# Patient Record
Sex: MALE | Race: WHITE | Employment: FULL TIME | URBAN - METROPOLITAN AREA
[De-identification: names, ages, dates, MRNs, and addresses within clinical notes are randomized per-mention and may not be internally consistent; named-entity substitution may affect disease eponyms.]

---

## 2017-07-02 ENCOUNTER — HOSPITAL ENCOUNTER (EMERGENCY)
Age: 32
Discharge: HOME | End: 2017-07-02
Payer: COMMERCIAL

## 2017-07-02 ENCOUNTER — APPOINTMENT (OUTPATIENT)
Dept: GENERAL RADIOLOGY | Age: 32
End: 2017-07-02
Payer: COMMERCIAL

## 2017-07-02 VITALS
TEMPERATURE: 98 F | BODY MASS INDEX: 24.25 KG/M2 | RESPIRATION RATE: 16 BRPM | HEIGHT: 75 IN | DIASTOLIC BLOOD PRESSURE: 85 MMHG | SYSTOLIC BLOOD PRESSURE: 140 MMHG | HEART RATE: 80 BPM | OXYGEN SATURATION: 98 % | WEIGHT: 195 LBS

## 2017-07-02 DIAGNOSIS — S61.411A LACERATION OF RIGHT HAND, FOREIGN BODY PRESENCE UNSPECIFIED, INITIAL ENCOUNTER: Primary | ICD-10-CM

## 2017-07-02 PROCEDURE — 90471 IMMUNIZATION ADMIN: CPT

## 2017-07-02 PROCEDURE — 99283 EMERGENCY DEPT VISIT LOW MDM: CPT

## 2017-07-02 PROCEDURE — 77030002916 HC SUT ETHLN J&J -A

## 2017-07-02 PROCEDURE — 75810000293 HC SIMP/SUPERF WND  RPR

## 2017-07-02 PROCEDURE — 90715 TDAP VACCINE 7 YRS/> IM: CPT

## 2017-07-02 PROCEDURE — 77030002986 HC SUT PROL J&J -A

## 2017-07-02 PROCEDURE — 74011250636 HC RX REV CODE- 250/636

## 2017-07-02 PROCEDURE — 73130 X-RAY EXAM OF HAND: CPT

## 2017-07-02 RX ORDER — MIRTAZAPINE 30 MG/1
30 TABLET, FILM COATED ORAL
COMMUNITY

## 2017-07-02 RX ORDER — ALPRAZOLAM 0.5 MG/1
0.5 TABLET ORAL
COMMUNITY

## 2017-07-02 RX ADMIN — TETANUS TOXOID, REDUCED DIPHTHERIA TOXOID AND ACELLULAR PERTUSSIS VACCINE, ADSORBED 0.5 ML: 5; 2.5; 8; 8; 2.5 SUSPENSION INTRAMUSCULAR at 03:05
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
## 2017-07-02 NOTE — DISCHARGE INSTRUCTIONS

## 2019-11-08 ENCOUNTER — HOSPITAL ENCOUNTER (EMERGENCY)
Age: 34
Discharge: STILL ADMITTED OR ADMINISTRATIVE | End: 2019-11-08
Attending: EMERGENCY MEDICINE
Payer: SELF-PAY

## 2019-11-08 ENCOUNTER — HOSPITAL ENCOUNTER (EMERGENCY)
Age: 34
Discharge: HOME | End: 2019-11-08
Attending: EMERGENCY MEDICINE
Payer: SELF-PAY

## 2019-11-08 ENCOUNTER — APPOINTMENT (OUTPATIENT)
Dept: GENERAL RADIOLOGY | Age: 34
End: 2019-11-08
Attending: EMERGENCY MEDICINE
Payer: SELF-PAY

## 2019-11-08 VITALS
SYSTOLIC BLOOD PRESSURE: 127 MMHG | OXYGEN SATURATION: 100 % | DIASTOLIC BLOOD PRESSURE: 74 MMHG | RESPIRATION RATE: 17 BRPM | BODY MASS INDEX: 23.62 KG/M2 | HEART RATE: 77 BPM | WEIGHT: 190 LBS | TEMPERATURE: 98.1 F | HEIGHT: 75 IN

## 2019-11-08 DIAGNOSIS — M25.511 PAIN IN JOINT OF RIGHT SHOULDER: Primary | ICD-10-CM

## 2019-11-08 PROCEDURE — 75810000275 HC EMERGENCY DEPT VISIT NO LEVEL OF CARE: Performed by: EMERGENCY MEDICINE

## 2019-11-08 PROCEDURE — 99282 EMERGENCY DEPT VISIT SF MDM: CPT | Performed by: PHYSICIAN ASSISTANT

## 2019-11-08 PROCEDURE — 73030 X-RAY EXAM OF SHOULDER: CPT

## 2019-11-08 RX ORDER — PREDNISONE 20 MG/1
20 TABLET ORAL DAILY
Qty: 7 TAB | Refills: 0 | Status: SHIPPED | OUTPATIENT
Start: 2019-11-08 | End: 2019-11-15

## 2019-11-08 RX ORDER — BUPROPION HYDROCHLORIDE 150 MG/1
150 TABLET, EXTENDED RELEASE ORAL 2 TIMES DAILY
COMMUNITY

## 2019-11-08 NOTE — ED PROVIDER NOTES
Pt  states he was working on his car 3 days ago and that evening started having pain to the posterior right shoulder area, has seen his chiropractor who did some stimulation and  order to have an  x-ray. He is here for x-rays. He has not seen his primary care for this problem not taking any oral medication    The history is provided by the patient. Shoulder Pain    The incident occurred more than 2 days ago. The incident occurred at home. There was no injury mechanism. The right shoulder is affected. The pain is at a severity of 7/10. The pain is moderate. The pain has been constant since onset. The pain does not radiate. There is no history of shoulder injury. There is no history of shoulder surgery. Pertinent negatives include no numbness. Past Medical History:   Diagnosis Date    Psychiatric disorder     depression       History reviewed. No pertinent surgical history. History reviewed. No pertinent family history. Social History     Socioeconomic History    Marital status: SINGLE     Spouse name: Not on file    Number of children: Not on file    Years of education: Not on file    Highest education level: Not on file   Occupational History    Not on file   Social Needs    Financial resource strain: Not on file    Food insecurity:     Worry: Not on file     Inability: Not on file    Transportation needs:     Medical: Not on file     Non-medical: Not on file   Tobacco Use    Smoking status: Current Every Day Smoker   Substance and Sexual Activity    Alcohol use:  Yes     Alcohol/week: 17.5 standard drinks     Types: 6 Cans of beer, 15 Shots of liquor per week    Drug use: No     Types: Marijuana    Sexual activity: Yes     Partners: Female   Lifestyle    Physical activity:     Days per week: Not on file     Minutes per session: Not on file    Stress: Not on file   Relationships    Social connections:     Talks on phone: Not on file     Gets together: Not on file     Attends Gnosticist service: Not on file     Active member of club or organization: Not on file     Attends meetings of clubs or organizations: Not on file     Relationship status: Not on file    Intimate partner violence:     Fear of current or ex partner: Not on file     Emotionally abused: Not on file     Physically abused: Not on file     Forced sexual activity: Not on file   Other Topics Concern    Not on file   Social History Narrative    Not on file         ALLERGIES: Patient has no known allergies. Review of Systems   Neurological: Negative for numbness. All other systems reviewed and are negative. Vitals:    11/08/19 1752   BP: 127/74   Pulse: 77   Resp: 17   Temp: 98.1 °F (36.7 °C)   SpO2: 100%   Weight: 86.2 kg (190 lb)   Height: 6' 3\" (1.905 m)            Physical Exam   Constitutional: He is oriented to person, place, and time. He appears well-developed and well-nourished. No distress. HENT:   Head: Normocephalic and atraumatic. Eyes: Pupils are equal, round, and reactive to light. EOM are normal.   Neck: Normal range of motion. Neck supple. Cardiovascular: Normal rate and regular rhythm. Pulmonary/Chest: Effort normal and breath sounds normal.   Abdominal: Soft. Bowel sounds are normal.   Musculoskeletal: Normal range of motion. He exhibits tenderness. He exhibits no edema or deformity. Full but painful rom of rt shoulder, pt points to scapular area as painful, no winging noted, no skin changes, no neck pain    Neurological: He is alert and oriented to person, place, and time. Skin: Skin is warm. He is not diaphoretic. Psychiatric: He has a normal mood and affect. Nursing note and vitals reviewed.        MDM  Number of Diagnoses or Management Options  Diagnosis management comments: Rt shoulder x rays negative for acute process  Will give course of prednisone for possible inflammatory pain   Stressed to see his primary Md for recheck        Amount and/or Complexity of Data Reviewed  Tests in the radiology section of CPT®: ordered and reviewed  Review and summarize past medical records: yes    Risk of Complications, Morbidity, and/or Mortality  Presenting problems: low  Diagnostic procedures: low  Management options: low    Patient Progress  Patient progress: improved         Procedures

## 2019-11-08 NOTE — LETTER
[address] [address] EMERGENCY DEPT 
[address] 
[place] [id number] [phone] Work/School Note Date: 11/8/2019 To Whom It May concern: 
 
Jude Chin was seen and treated today in the emergency room by the following provider(s): 
Attending Provider: Anner Litten, MD 
Physician Assistant: ELLA Lucio. Jude Chin may return to work on 11-9-19. Sincerely, ELLA Donnelly

## 2019-11-08 NOTE — DISCHARGE INSTRUCTIONS
Use meds as directed, alternate ice and heat to area, see your primary md or chiropractor for recheck next week

## 2019-11-08 NOTE — ED NOTES
I have reviewed discharge instructions with the patient. The patient verbalized understanding. Patient left ED via Discharge Method: ambulatory to Home with self. Opportunity for questions and clarification provided. Patient given 1 scripts. To continue your aftercare when you leave the hospital, you may receive an automated call from our care team to check in on how you are doing. This is a free service and part of our promise to provide the best care and service to meet your aftercare needs.  If you have questions, or wish to unsubscribe from this service please call [phone]. Thank you for Choosing our [facility] Emergency Department.

## 2021-03-19 ENCOUNTER — APPOINTMENT (OUTPATIENT)
Dept: CT IMAGING | Age: 36
End: 2021-03-19
Attending: PHYSICIAN ASSISTANT

## 2021-03-19 ENCOUNTER — HOSPITAL ENCOUNTER (EMERGENCY)
Age: 36
Discharge: HOME | End: 2021-03-19
Attending: EMERGENCY MEDICINE

## 2021-03-19 VITALS
TEMPERATURE: 98.5 F | DIASTOLIC BLOOD PRESSURE: 78 MMHG | HEIGHT: 75 IN | WEIGHT: 190 LBS | RESPIRATION RATE: 18 BRPM | OXYGEN SATURATION: 95 % | BODY MASS INDEX: 23.62 KG/M2 | HEART RATE: 78 BPM | SYSTOLIC BLOOD PRESSURE: 135 MMHG

## 2021-03-19 DIAGNOSIS — M54.50 ACUTE RIGHT-SIDED LOW BACK PAIN WITHOUT SCIATICA: ICD-10-CM

## 2021-03-19 DIAGNOSIS — R10.31 GROIN PAIN, RIGHT: Primary | ICD-10-CM

## 2021-03-19 LAB
ALBUMIN SERPL-MCNC: 4 G/DL (ref 3.5–5)
ALBUMIN/GLOB SERPL: 1.1 {RATIO} (ref 1.2–3.5)
ALP SERPL-CCNC: 97 U/L (ref 50–136)
ALT SERPL-CCNC: 24 U/L (ref 12–65)
ANION GAP SERPL CALC-SCNC: 3 MMOL/L (ref 7–16)
AST SERPL-CCNC: 26 U/L (ref 15–37)
BASOPHILS # BLD: 0.1 K/UL (ref 0–0.2)
BASOPHILS NFR BLD: 0 % (ref 0–2)
BILIRUB SERPL-MCNC: 0.3 MG/DL (ref 0.2–1.1)
BUN SERPL-MCNC: 12 MG/DL (ref 6–23)
CALCIUM SERPL-MCNC: 9.2 MG/DL (ref 8.3–10.4)
CHLORIDE SERPL-SCNC: 104 MMOL/L (ref 98–107)
CO2 SERPL-SCNC: 32 MMOL/L (ref 21–32)
CREAT SERPL-MCNC: 1.14 MG/DL (ref 0.8–1.5)
DIFFERENTIAL METHOD BLD: ABNORMAL
EOSINOPHIL # BLD: 0.2 K/UL (ref 0–0.8)
EOSINOPHIL NFR BLD: 2 % (ref 0.5–7.8)
ERYTHROCYTE [DISTWIDTH] IN BLOOD BY AUTOMATED COUNT: 12 % (ref 11.9–14.6)
GLOBULIN SER CALC-MCNC: 3.6 G/DL (ref 2.3–3.5)
GLUCOSE SERPL-MCNC: 85 MG/DL (ref 65–100)
HCT VFR BLD AUTO: 40.9 % (ref 41.1–50.3)
HGB BLD-MCNC: 13.9 G/DL (ref 13.6–17.2)
IMM GRANULOCYTES # BLD AUTO: 0 K/UL (ref 0–0.5)
IMM GRANULOCYTES NFR BLD AUTO: 0 % (ref 0–5)
LYMPHOCYTES # BLD: 1.8 K/UL (ref 0.5–4.6)
LYMPHOCYTES NFR BLD: 16 % (ref 13–44)
MCH RBC QN AUTO: 32.4 PG (ref 26.1–32.9)
MCHC RBC AUTO-ENTMCNC: 34 G/DL (ref 31.4–35)
MCV RBC AUTO: 95.3 FL (ref 79.6–97.8)
MONOCYTES # BLD: 0.9 K/UL (ref 0.1–1.3)
MONOCYTES NFR BLD: 8 % (ref 4–12)
NEUTS SEG # BLD: 8.3 K/UL (ref 1.7–8.2)
NEUTS SEG NFR BLD: 74 % (ref 43–78)
NRBC # BLD: 0 K/UL (ref 0–0.2)
PLATELET # BLD AUTO: 228 K/UL (ref 150–450)
PMV BLD AUTO: 10.7 FL (ref 9.4–12.3)
POTASSIUM SERPL-SCNC: 4.2 MMOL/L (ref 3.5–5.1)
PROT SERPL-MCNC: 7.6 G/DL (ref 6.3–8.2)
RBC # BLD AUTO: 4.29 M/UL (ref 4.23–5.6)
SODIUM SERPL-SCNC: 139 MMOL/L (ref 136–145)
WBC # BLD AUTO: 11.2 K/UL (ref 4.3–11.1)

## 2021-03-19 PROCEDURE — 74176 CT ABD & PELVIS W/O CONTRAST: CPT

## 2021-03-19 PROCEDURE — 85025 COMPLETE CBC W/AUTO DIFF WBC: CPT

## 2021-03-19 PROCEDURE — 96374 THER/PROPH/DIAG INJ IV PUSH: CPT

## 2021-03-19 PROCEDURE — 81003 URINALYSIS AUTO W/O SCOPE: CPT

## 2021-03-19 PROCEDURE — 74011250636 HC RX REV CODE- 250/636: Performed by: PHYSICIAN ASSISTANT

## 2021-03-19 PROCEDURE — 96361 HYDRATE IV INFUSION ADD-ON: CPT

## 2021-03-19 PROCEDURE — 99283 EMERGENCY DEPT VISIT LOW MDM: CPT

## 2021-03-19 PROCEDURE — 80053 COMPREHEN METABOLIC PANEL: CPT

## 2021-03-19 RX ORDER — IBUPROFEN 600 MG/1
600 TABLET ORAL
Qty: 20 TAB | Refills: 0 | Status: SHIPPED | OUTPATIENT
Start: 2021-03-19

## 2021-03-19 RX ORDER — KETOROLAC TROMETHAMINE 30 MG/ML
15 INJECTION, SOLUTION INTRAMUSCULAR; INTRAVENOUS
Status: COMPLETED | OUTPATIENT
Start: 2021-03-19 | End: 2021-03-19

## 2021-03-19 RX ADMIN — SODIUM CHLORIDE 1000 ML: 900 INJECTION, SOLUTION INTRAVENOUS at 21:59

## 2021-03-19 RX ADMIN — KETOROLAC TROMETHAMINE 15 MG: 30 INJECTION, SOLUTION INTRAMUSCULAR at 21:59

## 2021-03-20 NOTE — ED NOTES
I have reviewed discharge instructions with the patient. The patient verbalized understanding. Patient left ED via Discharge Method: ambulatory to Home with self). Opportunity for questions and clarification provided. Patient given 1 scripts. To continue your aftercare when you leave the hospital, you may receive an automated call from our care team to check in on how you are doing. This is a free service and part of our promise to provide the best care and service to meet your aftercare needs.  If you have questions, or wish to unsubscribe from this service please call [phone]. Thank you for Choosing our [facility] Emergency Department.

## 2021-03-20 NOTE — DISCHARGE INSTRUCTIONS
Please follow-up with your primary doctor. Take Ibuprofen every 6-8 hours and tylenol every 4-6 hours as needed for pain. Come for severely worsening or emergent symptoms.

## 2021-03-20 NOTE — ED TRIAGE NOTES
Pt c/o groin pain that goes into his back that started last night. Pt denies any swelling, denies pain during urination.  Pt denies n/v.

## 2021-03-20 NOTE — ED PROVIDER NOTES
Patient is a 25-year-old male who comes in with right flank and groin pain since yesterday. The pain comes and goes and there are no alleviating or exacerbating factors. The patient says that he has no pain in the testicles or penis. He denies fevers, chills, sweats, nausea, vomiting or diarrhea. Past Medical History:   Diagnosis Date    Psychiatric disorder     depression       No past surgical history on file. No family history on file. Social History     Socioeconomic History    Marital status: SINGLE     Spouse name: Not on file    Number of children: Not on file    Years of education: Not on file    Highest education level: Not on file   Occupational History    Not on file   Social Needs    Financial resource strain: Not on file    Food insecurity     Worry: Not on file     Inability: Not on file    Transportation needs     Medical: Not on file     Non-medical: Not on file   Tobacco Use    Smoking status: Current Every Day Smoker   Substance and Sexual Activity    Alcohol use:  Yes     Alcohol/week: 17.5 standard drinks     Types: 6 Cans of beer, 15 Shots of liquor per week    Drug use: No     Types: Marijuana    Sexual activity: Yes     Partners: Female   Lifestyle    Physical activity     Days per week: Not on file     Minutes per session: Not on file    Stress: Not on file   Relationships    Social connections     Talks on phone: Not on file     Gets together: Not on file     Attends Church service: Not on file     Active member of club or organization: Not on file     Attends meetings of clubs or organizations: Not on file     Relationship status: Not on file    Intimate partner violence     Fear of current or ex partner: Not on file     Emotionally abused: Not on file     Physically abused: Not on file     Forced sexual activity: Not on file   Other Topics Concern    Not on file   Social History Narrative    Not on file         ALLERGIES: Patient has no known allergies. Review of Systems   Constitutional: Negative for activity change. Respiratory: Negative for cough and shortness of breath. Cardiovascular: Negative for chest pain. Gastrointestinal: Positive for abdominal pain. Negative for blood in stool, nausea and vomiting. Genitourinary: Positive for flank pain. Negative for discharge, dysuria, frequency, hematuria, penile pain, penile swelling, scrotal swelling and testicular pain. Skin: Negative for rash. Neurological: Negative for speech difficulty and numbness. All other systems reviewed and are negative. Vitals:    03/19/21 2054   BP: (!) 141/93   Pulse: 85   Resp: 17   Temp: 98.8 °F (37.1 °C)   SpO2: 99%   Weight: 86.2 kg (190 lb)   Height: 6' 3\" (1.905 m)            Physical Exam  Vitals signs and nursing note reviewed. Exam conducted with a chaperone present. Constitutional:       Appearance: Normal appearance. HENT:      Head: Normocephalic and atraumatic. Eyes:      Conjunctiva/sclera: Conjunctivae normal.   Cardiovascular:      Rate and Rhythm: Normal rate and regular rhythm. Pulmonary:      Effort: Pulmonary effort is normal.      Breath sounds: Normal breath sounds. Abdominal:      General: Abdomen is flat. Bowel sounds are normal. There is no distension. Palpations: Abdomen is soft. There is no mass. Tenderness: There is no abdominal tenderness. There is no right CVA tenderness, left CVA tenderness or guarding. Hernia: No hernia is present. There is no hernia in the left inguinal area or right inguinal area. Genitourinary:     Penis: Normal. No phimosis, paraphimosis, hypospadias, erythema, tenderness, discharge, swelling or lesions. Testes: Normal. Cremasteric reflex is present. Epididymis:      Right: Normal.      Left: Normal.   Lymphadenopathy:      Lower Body: No right inguinal adenopathy. No left inguinal adenopathy. Skin:     General: Skin is warm and dry.    Neurological:      Mental Status: He is alert. MDM  Number of Diagnoses or Management Options  Acute right-sided low back pain without sciatica: new and requires workup  Groin pain, right: new and requires workup  Diagnosis management comments: Patient comes in with right low back pain/flank pain and right groin pain which began yesterday and has been coming and going. Patient has a normal genital examination. Urine dip was negative, labs were obtained as well as a CT urogram to evaluate for possible kidney stone. Labs are unremarkable and CT was negative. Patient was given fluids and Toradol and reported significant symptomatic improvement. He is being discharged home with close return precautions and outpatient follow-up recommended with the PCP.        Amount and/or Complexity of Data Reviewed  Clinical lab tests: ordered and reviewed  Tests in the radiology section of CPT®: ordered and reviewed  Tests in the medicine section of CPT®: ordered and reviewed    Risk of Complications, Morbidity, and/or Mortality  Presenting problems: moderate  Diagnostic procedures: moderate  Management options: low    Patient Progress  Patient progress: stable         Procedures

## 2022-03-19 PROBLEM — R10.31 GROIN PAIN, RIGHT: Status: ACTIVE | Noted: 2021-03-19

## 2022-03-19 PROBLEM — M54.50 ACUTE RIGHT-SIDED LOW BACK PAIN WITHOUT SCIATICA: Status: ACTIVE | Noted: 2021-03-19

## 2022-05-25 ENCOUNTER — OFFICE VISIT (OUTPATIENT)
Dept: ORTHOPEDIC SURGERY | Age: 37
End: 2022-05-25
Payer: COMMERCIAL

## 2022-05-25 DIAGNOSIS — M25.372 LEFT ANKLE INSTABILITY: ICD-10-CM

## 2022-05-25 DIAGNOSIS — G89.29 CHRONIC PAIN OF LEFT ANKLE: Primary | ICD-10-CM

## 2022-05-25 DIAGNOSIS — M25.572 CHRONIC PAIN OF LEFT ANKLE: Primary | ICD-10-CM

## 2022-05-25 PROCEDURE — 99214 OFFICE O/P EST MOD 30 MIN: CPT | Performed by: ORTHOPAEDIC SURGERY

## 2022-05-25 NOTE — PROGRESS NOTES
Visit    Weight-bearing status: WBAT        Return to work/work restrictions: none  none    At this point he has failed 2 years of conservative treatment. This the instability is really affecting his activities a day living. He would like to proceed with surgical fixation. We discussed the risk of increased pain relief in a 10% chance of recurrence of instability. We also discussed lack of utility of an MRI in the setting is about 30% of patients with a normal MRI reading still have intra-articular pathology. Left ankle arthroscopy with lateral ankle ligament reconstruction  Outpatient, 1 hour, scope equipment and [employer] anchors and internal brace  Anesthesia-choice    Risks and benefits of the procedures have been outlined especially possible creation of a 1 tendon angle laterally, stiffness, infection, and no pain relief for potential worsening of existing pain. Potential 1 year recovery outlined. Time of mobilization outlined. 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 The patient accepts and would like to proceed with surgery.

## 2022-05-26 PROBLEM — M25.372 ANKLE INSTABILITY, LEFT: Status: ACTIVE | Noted: 2022-05-26

## 2022-05-26 PROBLEM — M25.572 ANKLE PAIN, LEFT: Status: ACTIVE | Noted: 2022-05-26

## 2022-06-08 ENCOUNTER — ANESTHESIA EVENT (OUTPATIENT)
Dept: SURGERY | Age: 37
End: 2022-06-08
Payer: COMMERCIAL

## 2022-06-08 RX ORDER — SODIUM CHLORIDE 0.9 % (FLUSH) 0.9 %
5-40 SYRINGE (ML) INJECTION PRN
Status: CANCELLED | OUTPATIENT
Start: 2022-06-08

## 2022-06-08 RX ORDER — DEXTROSE MONOHYDRATE 50 MG/ML
100 INJECTION, SOLUTION INTRAVENOUS PRN
Status: CANCELLED | OUTPATIENT
Start: 2022-06-08

## 2022-06-08 RX ORDER — OXYCODONE HYDROCHLORIDE 5 MG/1
5 TABLET ORAL
Status: CANCELLED | OUTPATIENT
Start: 2022-06-08 | End: 2022-06-08

## 2022-06-08 RX ORDER — GLUCAGON 1 MG/ML
1 KIT INJECTION PRN
Status: CANCELLED | OUTPATIENT
Start: 2022-06-08

## 2022-06-08 RX ORDER — SODIUM CHLORIDE, SODIUM LACTATE, POTASSIUM CHLORIDE, CALCIUM CHLORIDE 600; 310; 30; 20 MG/100ML; MG/100ML; MG/100ML; MG/100ML
INJECTION, SOLUTION INTRAVENOUS CONTINUOUS
Status: CANCELLED | OUTPATIENT
Start: 2022-06-08

## 2022-06-08 RX ORDER — SODIUM CHLORIDE 9 MG/ML
INJECTION, SOLUTION INTRAVENOUS PRN
Status: CANCELLED | OUTPATIENT
Start: 2022-06-08

## 2022-06-08 RX ORDER — PROCHLORPERAZINE EDISYLATE 5 MG/ML
5 INJECTION INTRAMUSCULAR; INTRAVENOUS
Status: CANCELLED | OUTPATIENT
Start: 2022-06-08 | End: 2022-06-08

## 2022-06-08 RX ORDER — HYDROMORPHONE HYDROCHLORIDE 2 MG/ML
0.5 INJECTION, SOLUTION INTRAMUSCULAR; INTRAVENOUS; SUBCUTANEOUS EVERY 10 MIN PRN
Status: CANCELLED | OUTPATIENT
Start: 2022-06-08

## 2022-06-08 RX ORDER — DIPHENHYDRAMINE HYDROCHLORIDE 50 MG/ML
12.5 INJECTION INTRAMUSCULAR; INTRAVENOUS
Status: CANCELLED | OUTPATIENT
Start: 2022-06-08 | End: 2022-06-08

## 2022-06-08 RX ORDER — SODIUM CHLORIDE 0.9 % (FLUSH) 0.9 %
5-40 SYRINGE (ML) INJECTION EVERY 12 HOURS SCHEDULED
Status: CANCELLED | OUTPATIENT
Start: 2022-06-08

## 2022-06-08 NOTE — PERIOP NOTE
Patient verified name and . Order for consent not found in EHR; patient verifies procedure. Type 1B surgery, phone assessment complete. Orders not received. Labs per surgeon: none  Labs per anesthesia protocol: Patient states recent elevated K+ and Chloride, reviewed with Dr. Cornelio White, order received for [facility]    Patient answered medical/surgical history questions at their best of ability. All prior to admission medications documented in [facility]. Patient instructed to take the following medications the day of surgery according to anesthesia guidelines with a small sip of water: none On the day before surgery please take Acetaminophen 1000mg in the morning and then again before bed. You may substitute for Tylenol 650 mg. Hold all vitamins 7 days prior to surgery and NSAIDS 5 days prior to surgery. Prescription meds to hold:none  Patient instructed on the following:    > Arrive at [facility], time of arrival to be called the day before by 1700  > NPO after midnight including gum, mints, and ice chips  > Responsible adult must drive patient to the hospital, stay during surgery, and patient will need supervision 24 hours after anesthesia  > Use soap in shower the night before surgery and on the morning of surgery  > All piercings must be removed prior to arrival.    > Leave all valuables (money and jewelry) at home but bring insurance card and ID on DOS.   > You may be required to pay a deductible or co-pay on the day of your procedure. You can pre-pay by calling [phone] if your surgery is at the [facility] or [phone] if your surgery is at the [facility]. > Do not wear make-up, nail polish, lotions, cologne, perfumes, powders, or oil on skin. Artificial nails are not permitted.

## 2022-06-09 ENCOUNTER — ANESTHESIA (OUTPATIENT)
Dept: SURGERY | Age: 37
End: 2022-06-09
Payer: COMMERCIAL

## 2022-06-09 ENCOUNTER — TELEPHONE (OUTPATIENT)
Dept: ORTHOPEDIC SURGERY | Age: 37
End: 2022-06-09

## 2022-06-09 ENCOUNTER — HOSPITAL ENCOUNTER (OUTPATIENT)
Age: 37
Setting detail: OUTPATIENT SURGERY
Discharge: HOME | End: 2022-06-09
Attending: ORTHOPAEDIC SURGERY | Admitting: ORTHOPAEDIC SURGERY
Payer: COMMERCIAL

## 2022-06-09 VITALS
HEART RATE: 63 BPM | SYSTOLIC BLOOD PRESSURE: 107 MMHG | HEIGHT: 75 IN | RESPIRATION RATE: 16 BRPM | OXYGEN SATURATION: 99 % | TEMPERATURE: 97.7 F | WEIGHT: 185 LBS | DIASTOLIC BLOOD PRESSURE: 77 MMHG | BODY MASS INDEX: 23 KG/M2

## 2022-06-09 DIAGNOSIS — M25.372 ANKLE INSTABILITY, LEFT: ICD-10-CM

## 2022-06-09 DIAGNOSIS — G89.18 ACUTE POST-OPERATIVE PAIN: Primary | ICD-10-CM

## 2022-06-09 PROCEDURE — C1713 ANCHOR/SCREW BN/BN,TIS/BN: HCPCS | Performed by: ORTHOPAEDIC SURGERY

## 2022-06-09 PROCEDURE — 27698 REPAIR OF ANKLE LIGAMENT: CPT | Performed by: ORTHOPAEDIC SURGERY

## 2022-06-09 PROCEDURE — 29898 ANKLE ARTHROSCOPY/SURGERY: CPT | Performed by: ORTHOPAEDIC SURGERY

## 2022-06-09 PROCEDURE — 2500000003 HC RX 250 WO HCPCS: Performed by: NURSE ANESTHETIST, CERTIFIED REGISTERED

## 2022-06-09 PROCEDURE — 7100000001 HC PACU RECOVERY - ADDTL 15 MIN: Performed by: ORTHOPAEDIC SURGERY

## 2022-06-09 PROCEDURE — 7100000011 HC PHASE II RECOVERY - ADDTL 15 MIN: Performed by: ORTHOPAEDIC SURGERY

## 2022-06-09 PROCEDURE — 3600000014 HC SURGERY LEVEL 4 ADDTL 15MIN: Performed by: ORTHOPAEDIC SURGERY

## 2022-06-09 PROCEDURE — 6370000000 HC RX 637 (ALT 250 FOR IP): Performed by: ANESTHESIOLOGY

## 2022-06-09 PROCEDURE — 2709999900 HC NON-CHARGEABLE SUPPLY: Performed by: ORTHOPAEDIC SURGERY

## 2022-06-09 PROCEDURE — 6360000002 HC RX W HCPCS: Performed by: NURSE ANESTHETIST, CERTIFIED REGISTERED

## 2022-06-09 PROCEDURE — 76942 ECHO GUIDE FOR BIOPSY: CPT | Performed by: ANESTHESIOLOGY

## 2022-06-09 PROCEDURE — 2500000003 HC RX 250 WO HCPCS: Performed by: ANESTHESIOLOGY

## 2022-06-09 PROCEDURE — 2580000003 HC RX 258: Performed by: NURSE PRACTITIONER

## 2022-06-09 PROCEDURE — 3700000001 HC ADD 15 MINUTES (ANESTHESIA): Performed by: ORTHOPAEDIC SURGERY

## 2022-06-09 PROCEDURE — 6360000002 HC RX W HCPCS: Performed by: ANESTHESIOLOGY

## 2022-06-09 PROCEDURE — 3600000004 HC SURGERY LEVEL 4 BASE: Performed by: ORTHOPAEDIC SURGERY

## 2022-06-09 PROCEDURE — 7100000010 HC PHASE II RECOVERY - FIRST 15 MIN: Performed by: ORTHOPAEDIC SURGERY

## 2022-06-09 PROCEDURE — 2720000010 HC SURG SUPPLY STERILE: Performed by: ORTHOPAEDIC SURGERY

## 2022-06-09 PROCEDURE — 7100000000 HC PACU RECOVERY - FIRST 15 MIN: Performed by: ORTHOPAEDIC SURGERY

## 2022-06-09 PROCEDURE — 6360000002 HC RX W HCPCS: Performed by: NURSE PRACTITIONER

## 2022-06-09 PROCEDURE — 3700000000 HC ANESTHESIA ATTENDED CARE: Performed by: ORTHOPAEDIC SURGERY

## 2022-06-09 DEVICE — HEALICOIL REGENESORB 4.75 MM                                    SUTURE ANCHOR WITH ONE ULTRATAPE                                    SUTURE BLUE AND ONE NO.2 ULTRABRAID SUTURE
Type: IMPLANTABLE DEVICE | Site: ANKLE | Status: FUNCTIONAL
Brand: HEALICOIL REGENESORB

## 2022-06-09 DEVICE — FOOTPRINT ULTRA PK SUTURE ANCHOR 4.5
Type: IMPLANTABLE DEVICE | Site: ANKLE | Status: FUNCTIONAL
Brand: FOOTPRINT

## 2022-06-09 DEVICE — 1.8MM Q-FIX ALL SUTURE ANCHOR
Type: IMPLANTABLE DEVICE | Site: ANKLE | Status: FUNCTIONAL
Brand: Q-FIX

## 2022-06-09 RX ORDER — SODIUM CHLORIDE 0.9 % (FLUSH) 0.9 %
5-40 SYRINGE (ML) INJECTION PRN
Status: DISCONTINUED | OUTPATIENT
Start: 2022-06-09 | End: 2022-06-09 | Stop reason: HOSPADM

## 2022-06-09 RX ORDER — PROPOFOL 10 MG/ML
INJECTION, EMULSION INTRAVENOUS CONTINUOUS PRN
Status: DISCONTINUED | OUTPATIENT
Start: 2022-06-09 | End: 2022-06-09 | Stop reason: SDUPTHER

## 2022-06-09 RX ORDER — ROPIVACAINE HYDROCHLORIDE 5 MG/ML
INJECTION, SOLUTION EPIDURAL; INFILTRATION; PERINEURAL
Status: COMPLETED | OUTPATIENT
Start: 2022-06-09 | End: 2022-06-09

## 2022-06-09 RX ORDER — CEPHALEXIN 500 MG/1
500 CAPSULE ORAL 4 TIMES DAILY
Qty: 12 CAPSULE | Refills: 0 | Status: SHIPPED | OUTPATIENT
Start: 2022-06-09

## 2022-06-09 RX ORDER — SODIUM CHLORIDE 0.9 % (FLUSH) 0.9 %
5-40 SYRINGE (ML) INJECTION EVERY 12 HOURS SCHEDULED
Status: DISCONTINUED | OUTPATIENT
Start: 2022-06-09 | End: 2022-06-09 | Stop reason: HOSPADM

## 2022-06-09 RX ORDER — SODIUM CHLORIDE 9 MG/ML
INJECTION, SOLUTION INTRAVENOUS PRN
Status: DISCONTINUED | OUTPATIENT
Start: 2022-06-09 | End: 2022-06-09 | Stop reason: HOSPADM

## 2022-06-09 RX ORDER — LIDOCAINE HYDROCHLORIDE AND EPINEPHRINE 20; 5 MG/ML; UG/ML
INJECTION, SOLUTION EPIDURAL; INFILTRATION; INTRACAUDAL; PERINEURAL PRN
Status: DISCONTINUED | OUTPATIENT
Start: 2022-06-09 | End: 2022-06-09 | Stop reason: SDUPTHER

## 2022-06-09 RX ORDER — PROPOFOL 10 MG/ML
INJECTION, EMULSION INTRAVENOUS PRN
Status: DISCONTINUED | OUTPATIENT
Start: 2022-06-09 | End: 2022-06-09 | Stop reason: SDUPTHER

## 2022-06-09 RX ORDER — DEXAMETHASONE SODIUM PHOSPHATE 4 MG/ML
INJECTION, SOLUTION INTRA-ARTICULAR; INTRALESIONAL; INTRAMUSCULAR; INTRAVENOUS; SOFT TISSUE PRN
Status: DISCONTINUED | OUTPATIENT
Start: 2022-06-09 | End: 2022-06-09 | Stop reason: SDUPTHER

## 2022-06-09 RX ORDER — LIDOCAINE HYDROCHLORIDE 20 MG/ML
INJECTION, SOLUTION EPIDURAL; INFILTRATION; INTRACAUDAL; PERINEURAL PRN
Status: DISCONTINUED | OUTPATIENT
Start: 2022-06-09 | End: 2022-06-09 | Stop reason: SDUPTHER

## 2022-06-09 RX ORDER — KETOROLAC TROMETHAMINE 10 MG/1
10 TABLET, FILM COATED ORAL EVERY 6 HOURS PRN
Qty: 12 TABLET | Refills: 0 | Status: SHIPPED | OUTPATIENT
Start: 2022-06-09 | End: 2022-06-12

## 2022-06-09 RX ORDER — SODIUM CHLORIDE, SODIUM LACTATE, POTASSIUM CHLORIDE, CALCIUM CHLORIDE 600; 310; 30; 20 MG/100ML; MG/100ML; MG/100ML; MG/100ML
INJECTION, SOLUTION INTRAVENOUS CONTINUOUS
Status: DISCONTINUED | OUTPATIENT
Start: 2022-06-09 | End: 2022-06-09 | Stop reason: HOSPADM

## 2022-06-09 RX ORDER — ASPIRIN 81 MG/1
81 TABLET ORAL 2 TIMES DAILY
Qty: 42 TABLET | Refills: 0 | Status: SHIPPED | OUTPATIENT
Start: 2022-06-09

## 2022-06-09 RX ORDER — ACETAMINOPHEN 500 MG
1000 TABLET ORAL ONCE
Status: COMPLETED | OUTPATIENT
Start: 2022-06-09 | End: 2022-06-09

## 2022-06-09 RX ORDER — LIDOCAINE HYDROCHLORIDE 10 MG/ML
1 INJECTION, SOLUTION EPIDURAL; INFILTRATION; INTRACAUDAL; PERINEURAL
Status: DISCONTINUED | OUTPATIENT
Start: 2022-06-09 | End: 2022-06-09 | Stop reason: HOSPADM

## 2022-06-09 RX ORDER — MIDAZOLAM HYDROCHLORIDE 2 MG/2ML
2 INJECTION, SOLUTION INTRAMUSCULAR; INTRAVENOUS
Status: COMPLETED | OUTPATIENT
Start: 2022-06-09 | End: 2022-06-09

## 2022-06-09 RX ORDER — FENTANYL CITRATE 50 UG/ML
100 INJECTION, SOLUTION INTRAMUSCULAR; INTRAVENOUS
Status: COMPLETED | OUTPATIENT
Start: 2022-06-09 | End: 2022-06-09

## 2022-06-09 RX ORDER — OXYCODONE HYDROCHLORIDE 5 MG/1
5 TABLET ORAL EVERY 6 HOURS PRN
Qty: 20 TABLET | Refills: 0 | Status: SHIPPED | OUTPATIENT
Start: 2022-06-09 | End: 2022-06-09 | Stop reason: HOSPADM

## 2022-06-09 RX ADMIN — ACETAMINOPHEN 1000 MG: 500 TABLET ORAL at 09:08

## 2022-06-09 RX ADMIN — DEXAMETHASONE SODIUM PHOSPHATE 4 MG: 4 INJECTION, SOLUTION INTRAMUSCULAR; INTRAVENOUS at 09:35

## 2022-06-09 RX ADMIN — Medication 2000 MG: at 10:11

## 2022-06-09 RX ADMIN — ROPIVACAINE HYDROCHLORIDE 10 ML: 5 INJECTION, SOLUTION EPIDURAL; INFILTRATION; PERINEURAL at 09:37

## 2022-06-09 RX ADMIN — FENTANYL CITRATE 100 MCG: 50 INJECTION, SOLUTION INTRAMUSCULAR; INTRAVENOUS at 09:35

## 2022-06-09 RX ADMIN — ROPIVACAINE HYDROCHLORIDE 20 ML: 5 INJECTION, SOLUTION EPIDURAL; INFILTRATION; PERINEURAL at 09:35

## 2022-06-09 RX ADMIN — PROPOFOL 50 MG: 10 INJECTION, EMULSION INTRAVENOUS at 10:20

## 2022-06-09 RX ADMIN — DEXAMETHASONE SODIUM PHOSPHATE 4 MG: 4 INJECTION, SOLUTION INTRAMUSCULAR; INTRAVENOUS at 09:37

## 2022-06-09 RX ADMIN — SODIUM CHLORIDE, POTASSIUM CHLORIDE, SODIUM LACTATE AND CALCIUM CHLORIDE: 600; 310; 30; 20 INJECTION, SOLUTION INTRAVENOUS at 09:09

## 2022-06-09 RX ADMIN — PROPOFOL 200 MCG/KG/MIN: 10 INJECTION, EMULSION INTRAVENOUS at 10:20

## 2022-06-09 RX ADMIN — PROPOFOL 50 MG: 10 INJECTION, EMULSION INTRAVENOUS at 10:18

## 2022-06-09 RX ADMIN — LIDOCAINE HYDROCHLORIDE 40 MG: 20 INJECTION, SOLUTION EPIDURAL; INFILTRATION; INTRACAUDAL; PERINEURAL at 10:18

## 2022-06-09 RX ADMIN — LIDOCAINE HYDROCHLORIDE,EPINEPHRINE BITARTRATE 5 ML: 20; .005 INJECTION, SOLUTION EPIDURAL; INFILTRATION; INTRACAUDAL; PERINEURAL at 09:37

## 2022-06-09 RX ADMIN — LIDOCAINE HYDROCHLORIDE,EPINEPHRINE BITARTRATE 5 ML: 20; .005 INJECTION, SOLUTION EPIDURAL; INFILTRATION; INTRACAUDAL; PERINEURAL at 09:35

## 2022-06-09 RX ADMIN — MIDAZOLAM 2 MG: 1 INJECTION INTRAMUSCULAR; INTRAVENOUS at 09:35

## 2022-06-09 ASSESSMENT — LIFESTYLE VARIABLES: SMOKING_STATUS: 1

## 2022-06-09 NOTE — ANESTHESIA PROCEDURE NOTES
Peripheral Block    Patient location during procedure: pre-op  Start time: 6/9/2022 9:37 AM  End time: 6/9/2022 9:40 AM  Staffing  Performed: anesthesiologist   Anesthesiologist: Parish Amato MD  Preanesthetic Checklist  Completed: patient identified, IV checked, site marked, risks and benefits discussed, surgical/procedural consents, equipment checked, pre-op evaluation, timeout performed, anesthesia consent given, oxygen available and monitors applied/VS acknowledged  Peripheral Block  Patient position: supine  Prep: ChloraPrep  Patient monitoring: cardiac monitor, continuous pulse ox, frequent blood pressure checks, IV access, oxygen and responsive to questions  Block type: Femoral  Laterality: left  Injection technique: single-shot  Guidance: ultrasound guided  Adductor canal  Provider prep: sterile gloves and mask  Needle  Needle type: insulated echogenic nerve stimulator needle   Needle localization: ultrasound guidance  Assessment  Injection assessment: negative aspiration for heme, no paresthesia on injection, local visualized surrounding nerve on ultrasound and no intravascular symptoms  Paresthesia pain: none  Slow fractionated injection: yes  Hemodynamics: stablepermanent images obtainedOutcomes: uncomplicated and patient tolerated procedure well  Additional Notes  Ultrasound image taken and stored in chart   Medications Administered  Ropivacaine (NAROPIN) injection 0.5%, 10 mL  Reason for block: post-op pain management and at surgeon's request

## 2022-06-09 NOTE — BRIEF OP NOTE
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 Item Serial No.  Lot No. LRB No. Used Action   ANCHOR SUT DIA4.5MM TAP IN [facility] FOR SFT TISS FIX FOOTPRINT - [id number]  ANCHOR SUT DIA4.5MM TAP IN THE [facility] FOR SFT TISS FIX FOOTPRINT  [facility] NEPH ENDOSCOPY-WD [id number] Left 1 Implanted   ANCHOR SUT DIA4. 75MM W/ KEN Alexu Ege - [id number]  ANCHOR SUT DIA4. 75MM W/ [facility]EN [facility] NEPH [facility]-WD [id number] Left 1 Implanted   ANCHOR SUT DIA1. 8MM FOR ACET LABRAL REP Q-FIX - [id number]  ANCHOR SUT DIA1. 8MM FOR ACET LABRAL REP Q-FIX  [facility] AND [facility] [id number] Left 2 Implanted         Drains: * No LDAs found *    Findings:     Electronically signed by Margret Miguel MD on 6/9/2022 at 11:03 AM

## 2022-06-09 NOTE — TELEPHONE ENCOUNTER
[facility] is calling because there is a severe drug interaction between [address] and asa. Please call.
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
## 2022-06-09 NOTE — ANESTHESIA POSTPROCEDURE EVALUATION
Department of Anesthesiology  Postprocedure Note    Patient: Shruthi Clarke  MRN: [id number]  YOB: 1985  Date of evaluation: 6/9/2022  Time:  12:05 PM     Procedure Summary     Date: 06/09/22 Room / Location: [facility] OP OR 01 / [facility]    Anesthesia Start: 3276 Anesthesia Stop: 1118    Procedure: left ankle arthroscopy with lateral ankle ligament reconstruction (Left Ankle) Diagnosis:       Ankle instability, left      Ankle pain, left      (Ankle instability, left [M25.372])      (Ankle pain, left [M25.572])    Surgeons: Hayes Valdivia MD Responsible Provider: Kavin Herrera MD    Anesthesia Type: TIVA ASA Status: 2          Anesthesia Type: No value filed. Renato Phase I: Renato Score: 7    Renato Phase II:      Last vitals: Reviewed and per EMR flowsheets. Anesthesia Post Evaluation    Patient location during evaluation: PACU  Patient participation: complete - patient participated  Level of consciousness: awake and alert  Airway patency: patent  Nausea: well controlled. Complications: no  Cardiovascular status: acceptable.   Respiratory status: acceptable  Hydration status: stable

## 2022-06-09 NOTE — ANESTHESIA PROCEDURE NOTES
Peripheral Block    Patient location during procedure: pre-op  Start time: 6/9/2022 9:35 AM  End time: 6/9/2022 9:37 AM  Staffing  Performed: anesthesiologist   Anesthesiologist: Lane Moses MD  Preanesthetic Checklist  Completed: patient identified, IV checked, site marked, risks and benefits discussed, surgical/procedural consents, equipment checked, pre-op evaluation, timeout performed, anesthesia consent given, oxygen available and monitors applied/VS acknowledged  Peripheral Block  Patient position: supine  Prep: ChloraPrep  Patient monitoring: cardiac monitor, continuous pulse ox, frequent blood pressure checks, IV access, oxygen and responsive to questions  Block type: Sciatic  Laterality: left  Injection technique: single-shot  Guidance: ultrasound guided  Popliteal  Provider prep: mask and sterile gloves  Needle  Needle type: insulated echogenic nerve stimulator needle   Needle localization: ultrasound guidance  Assessment  Injection assessment: negative aspiration for heme, no paresthesia on injection, local visualized surrounding nerve on ultrasound and no intravascular symptoms  Paresthesia pain: none  Slow fractionated injection: yes  Hemodynamics: stablepermanent images obtainedOutcomes: uncomplicated and patient tolerated procedure well  Additional Notes  Ultrasound image taken and stored in chart   Medications Administered  Ropivacaine (NAROPIN) injection 0.5%, 20 mL  Reason for block: post-op pain management and at surgeon's request

## 2022-06-09 NOTE — OP NOTE
Operative Note    [address]  MRN: [id number]    Date Of Surgery: 6/9/2022    Surgeon: José Antonio Albert MD    Assistant Surgeon: None    Pre Op Diagnosis:  Ankle instability, left [M25.372]  Ankle pain, left [M25.572]    Post Op Diagnosis:   same    Procedures Performed:  1. Left ankle arthroscopy with extensive debridement, 79215  2. Left lateral ankle ligament reconstruction, Jacquiline Liner, 65334    Implants:   Implant Name Type Inv. Item Serial No.  Lot No. LRB No. Used Action   ANCHOR SUT DIA4.5MM TAP IN [facility] FOR SFT TISS FIX FOOTPRINT - [id number]  ANCHOR SUT DIA4.5MM TAP IN THE [employer] FOR SFT TISS FIX FOOTPRINT  [employer]-WD [id number] Left 1 Implanted   ANCHOR SUT DIA4. 75MM W/ KEN Marlou Ege - [id number]  ANCHOR SUT DIA4. 75MM W/ KEN [facility] [employer]OS[employer]-WD [id number] Left 1 Implanted   ANCHOR SUT DIA1. 8MM FOR ACET LABRAL REP Q-FIX - [id number]  ANCHOR SUT DIA1. 8MM FOR ACET LABRAL REP Q-FIX  Roylene Sermon AND [employer]W Rich Heads [id number] Left 2 Implanted       Anesthesia:  Regional    Blood Loss:  minimal    Tourniquet:  Estimated calf 40 minutes    Pre Operative Abx:   Ancef 2g            Pre Operative Course:  Flavia Valdivia is a 40 y.o. male who has a history of chronic left lateral ankle instability. Operation In Detail:  Patient was evaluated in the preoperative area. We had a long discussion about the procedure and postoperative protocols. The patient was then brought back to the operating room suite and placed in the operating room table. A timeout was taken to identify the patient, procedure being performed, and laterality. After this the patient was prepped and draped in the normal sterile fashion using a Betadine solution and/or a ChloraPrep solution. A timeout was then taken to identify the patient his name, date of birth, laterality, and procedure being performed.   We also identified allergies and any Immobilization/assistive devices: crutches  3- DVT px: ASA 81 mg BID
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
## 2022-06-13 ENCOUNTER — TELEPHONE (OUTPATIENT)
Dept: ORTHOPEDIC SURGERY | Age: 37
End: 2022-06-13

## 2022-06-13 NOTE — TELEPHONE ENCOUNTER
Call to Pt Re: Post op Meds. LM on Pt VM to return call. Did speak to pt's Mother and advised now that pt is done with Toradol pt is to start ASA 81mg BID. PT's mother states she will inform Pt.  [facility]

## 2022-06-14 ENCOUNTER — TELEPHONE (OUTPATIENT)
Dept: ORTHOPEDIC SURGERY | Age: 37
End: 2022-06-14

## 2022-06-14 NOTE — TELEPHONE ENCOUNTER
Spoke to Pt and he denies Pain or swelling. Pt did preform Capillary refill test on his toes while on the phone this was good. Pt advised to move his toes continue to elevate and call if he has any further questions. Pt voiced complete understanding.  [facility]

## 2022-06-14 NOTE — TELEPHONE ENCOUNTER
He had surgery Thursday and they were told to call if his toes were cold. She says they are cold and wants to know if this is okay.

## 2022-06-24 ENCOUNTER — OFFICE VISIT (OUTPATIENT)
Dept: ORTHOPEDIC SURGERY | Age: 37
End: 2022-06-24
Payer: COMMERCIAL

## 2022-06-24 DIAGNOSIS — M25.372 LEFT ANKLE INSTABILITY: Primary | ICD-10-CM

## 2022-06-24 PROCEDURE — L4360 PNEUMAT WALKING BOOT PRE CST: HCPCS | Performed by: NURSE PRACTITIONER

## 2022-06-24 PROCEDURE — 99024 POSTOP FOLLOW-UP VISIT: CPT | Performed by: NURSE PRACTITIONER

## 2022-06-24 NOTE — PROGRESS NOTES
Name: Jordin Larios  YOB: 1985  Gender: male  MRN: [id number]    Procedure Performed:  1. Left ankle arthroscopy with extensive debridement  2. Left lateral ankle ligament reconstruction, Maddi Sykes      Date of Procedure: 06/09/2022    Subjective: Patient seems to be doing pretty well this early on postoperatively. He notes that he is followed postoperative instructions up to this point. He is happy to be progressing with recovery process      Physical Examination: Patient's incisions are continuing to heal well and show no signs of infection present. His skin is warm and dry and he has palpable pulses and intact sensation to the foot. Range of motion to the ankle joint was performed without difficulty, minimal stiffness and guarding from the patient however there was no pain experienced by the patient. With examination the ankle ligament repair is intact as evidence with talar tilt testing and anterior drawer testing. He has no signs of DVT at this time, no calf pain and a negative Homans' sign. Imaging:   No imaging reviewed                      Assessment:   Status post left ankle arthroscopy with extensive debridement and lateral ankle ligament reconstruction. We discussed progression of care from this point as well as the expectations until his next follow-up visit. Questions and concerns were both the patient and his mother were addressed, they verbalized understanding of today's conversation. Plan:   3 This is stable chronic illness/condition  Treatment at this time: Sutures were removed today, Steri-Strips placed. Patient was placed into a cam walker boot as a matter medical necessity. He may progressively bear weight on this as he feels he can tolerate and swelling allows. He may also discontinue his daily aspirin therapy for DVT prophylaxis at this time.   He was encouraged to elevate the affected extremity to help with the swelling that he is currently experiencing. He may now get the foot wet including showering and soaking as needed, recommendations Epsom salts was given to help with additional incisional healing as well as swelling purposes. He will start open chain range of motion exercises when not in the boot. He will follow-up in 4 weeks or sooner if needed.   Studies ordered: NO XR needed @ Next Visit    Weight-bearing status: WBAT in Boot/hardsole shoe        Return to work/work restrictions: none  none

## 2022-06-24 NOTE — PROGRESS NOTES
The patient was prescribed a walker boot for the patient's left foot. The patient wears a size 12 shoe and I fitted them with a L size boot. The patient was fitted and instructed on the use of prescribed walker boot. I explained how to fit themselves and that the plastic flexible piece should always be on the front of the boot and secured by the Velcro straps on top. The air bladder in the boot was adjusted according to proper fit and comfort. The patient walked a short distance and acknowledged satisfaction with current fit. I also explained that they need a heel lift or a higher heeled shoe for the uninvolved LE to help normalize gait and avoid excessive low back stress/strain due to leg length inequality created from walker boot. Patient read and signed documenting they understand and agree to [facility]'s current DME return policy.

## 2022-08-10 ENCOUNTER — OFFICE VISIT (OUTPATIENT)
Dept: ORTHOPEDIC SURGERY | Age: 37
End: 2022-08-10

## 2022-08-10 DIAGNOSIS — M25.372 LEFT ANKLE INSTABILITY: Primary | ICD-10-CM

## 2022-08-10 PROCEDURE — 99024 POSTOP FOLLOW-UP VISIT: CPT | Performed by: ORTHOPAEDIC SURGERY

## 2022-08-10 NOTE — PROGRESS NOTES
Name: Natanael Lux  YOB: 1985  Gender: male  MRN: [id number]    Procedure Performed:left ankle arthroscopy with lateral ankle ligament reconstruction - Left        Date of Procedure: 6/9/2022      Subjective: Doing okay. Still using some crutches some. He did miss his last follow-up appointment. Physical Examination: He does have some swelling in the leg. Is no sign of DVT or infection noted. He has palpable pulses and intact sensation. He has great stability to talar tilt testing. Imaging:   No imaging reviewed           Lili Pace III, MD           Assessment:   Lateral ankle ligament reconstruction      Plan:   3 This is stable chronic illness/condition  Treatment at this time: Physical Therapy  Studies ordered: NO XR needed @ Next Visit    Weight-bearing status: WBAT        Return to work/work restrictions: none  none    He is behind on his rehab as he was lost to follow-up for a while. I recommended supervised PT. He agreed to this and will continue with his lace up ankle brace and return in 6 weeks.

## 2022-09-13 ENCOUNTER — HOSPITAL ENCOUNTER (OUTPATIENT)
Dept: PHYSICAL THERAPY | Age: 37
Setting detail: RECURRING SERIES
Discharge: HOME | End: 2022-09-16
Payer: COMMERCIAL

## 2022-09-13 PROCEDURE — 97110 THERAPEUTIC EXERCISES: CPT

## 2022-09-13 PROCEDURE — 97016 VASOPNEUMATIC DEVICE THERAPY: CPT

## 2022-09-13 PROCEDURE — 97161 PT EVAL LOW COMPLEX 20 MIN: CPT

## 2022-09-13 NOTE — PROGRESS NOTES
Charlotte Conti  : 1985  Primary: Leona Martinez  Secondary:  Bushra Retana  [address] [facility] [address]  Phone: [phone]  Fax: [phone] Plan Frequency: 2x/week    Plan of Care/Certification Expiration Date: 22      PT Visit Info:  No data recorded   Visit Count:  1   OUTPATIENT PHYSICAL THERAPY:OP NOTE TYPE: Treatment Note 2022       Episode  }Appt Desk             Treatment Diagnosis:  Difficulty in walking, Not elsewhere classified (R26.2)  Other abnormalities of gait and mobility (R26.89)  Medical/Referring Diagnosis:  Left ankle instability [M25.372]  Referring Physician:  Elizabet Novak MD MD Orders:  PT Eval and Treat   Date of Onset:  Onset Date: 22     Allergies:   Tramadol  Restrictions/Precautions:  Restrictions/Precautions: Surgical protocol; Weight Bearing  Right Lower Extremity Weight Bearing: Weight Bearing As Tolerated     Interventions Planned (Treatment may consist of any combination of the following):    Current Treatment Recommendations: ROM; Strengthening; Balance training; Functional mobility training; ADL/Self-care training; Neuromuscular re-education; Manual Therapy - Soft Tissue Mobilization; Manual Therapy - Joint Manipulation; Home exercise program; Modalities; Therapeutic activities     Subjective Comments: See IE 22. Initial:}     7/10Post Session:        7/10  Medications Last Reviewed:  2022  Updated Objective Findings:  See evaluation note from today  Treatment   TREATMENT:   THERAPEUTIC ACTIVITY: ( see below for minutes): Therapeutic activities per grid below to improve mobility, strength, balance and coordination. Required minimal visual, verbal, manual and tactile cues to improve independence and safety with daily activities . THERAPEUTIC EXERCISE: (see below for minutes): Exercises per grid below to improve mobility, strength, balance and coordination.  Required minimal verbal and manual cues to promote proper body alignment, promote proper body posture and promote proper body mechanics. Progressed resistance, range, repetitions and complexity of movement as indicated. MANUAL THERAPY: (see below for minutes): Joint mobilization and Soft tissue mobilization was utilized and necessary because of the patient's restricted joint motion, painful spasm, loss of articular motion and restricted motion of soft tissue. MODALITIES: (see below for minutes): to decrease pain   SELF CARE: (see below for minutes): Procedure(s) (per grid) utilized to improve and/or restore self-care/home management as related to dressing, bathing and grooming. Required minimal verbal cueing to facilitate activities of daily living skills and compensatory activities    Date: 9/13/22    Modalities: 15 min    Gameready  15min           Therapeutic Exercise: 25 min    HEP review    Ankle pump  2x20    Ankle [place]  X20 CW/CCW    Toe curl/extensions  x30           Proprioceptive Activities:                Manual Therapy:            Functional Activities:                        Treatment/Session Summary:    Treatment Assessment: See IE 9/13/22. Communication/Consultation:  None today  Equipment provided today:  None  Recommendations/Intent for next treatment session: Next visit will focus on decrease swelling, ankle ROM and strengthening per protocol.     Total Treatment Billable Duration:  55 minutes  Time In: 2167  Time Out: 6002    SAL CATHERINE, PT       Charge Capture  }Post Session Pain  PT Visit Info  [facility] Portal  MD Guidelines  Scanned Media  Benefits  MyChart    Future Appointments   Date Time Provider Ana Sebastian   9/15/2022  4:15 PM [facility]   9/19/2022  2:45 PM Shannana Matilda, PT [facility]   9/21/2022  4:15 PM Shannana Matilda, PT [facility]   9/27/2022  2:00 PM Lurena Matilda, PT [facility]   9/30/2022  2:00 PM Lurena Matilda, PT [facility]
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
## 2022-09-15 ENCOUNTER — HOSPITAL ENCOUNTER (OUTPATIENT)
Dept: PHYSICAL THERAPY | Age: 37
Setting detail: RECURRING SERIES
Discharge: HOME | End: 2022-09-18
Payer: COMMERCIAL

## 2022-09-15 ENCOUNTER — TELEPHONE (OUTPATIENT)
Dept: ORTHOPEDIC SURGERY | Age: 37
End: 2022-09-15

## 2022-09-15 PROCEDURE — 97110 THERAPEUTIC EXERCISES: CPT

## 2022-09-15 PROCEDURE — 97140 MANUAL THERAPY 1/> REGIONS: CPT

## 2022-09-15 NOTE — PROGRESS NOTES
OUTPATIENT DAILY NOTE    NAME/AGE/GENDER: Bean Jolley is a 40 y.o. male. DATE: 9/15/2022    Mr. Conti NO SHOWED for today's appointment. An attempt to contact Waldemar Anya Li was made however phone is not in service. This is his 1st recent no show.     Reyna Thorpe, PT

## 2022-09-15 NOTE — PROGRESS NOTES
and coordination. Required minimal verbal and manual cues to promote proper body alignment, promote proper body posture and promote proper body mechanics. Progressed resistance, range, repetitions and complexity of movement as indicated. MANUAL THERAPY: (see below for minutes): Joint mobilization and Soft tissue mobilization was utilized and necessary because of the patient's restricted joint motion, painful spasm, loss of articular motion and restricted motion of soft tissue. MODALITIES: (see below for minutes): to decrease pain   SELF CARE: (see below for minutes): Procedure(s) (per grid) utilized to improve and/or restore self-care/home management as related to dressing, bathing and grooming. Required minimal verbal cueing to facilitate activities of daily living skills and compensatory activities    Date: 9/13/22  9/15/22    Modalities: 15 min     Gameready  15min              Therapeutic Exercise: 25 min 20 min    HEP review Ankle [place]  X20 CW/CCW    Ankle pump  2x20 Ankle PF/DF  3x10  blue    Ankle [place]  X20 CW/CCW Seated toe /extension curl  x40    Toe curl/extensions  x30 Toe curls  x30             Proprioceptive Activities:                    Manual Therapy:  10 min     TCJ AP mobility  Gr II-III  10 min        Functional Activities:                             Treatment/Session Summary:    Treatment Assessment: Patient arriving 15 min late to second appointment consecutively, discussed with patient importance of arriving on time to allow for proper time of treatment and progressions, patient verbalized agreement. Patient observed with dried paint on plantar and dorsal surface of L foot today. Communication/Consultation:   Left voicemail with MD office regarding patient's plan of care in terms of progressing out of tall boot.    Equipment provided today:  None  Recommendations/Intent for next treatment session: Next visit will focus on decrease swelling, ankle ROM and strengthening per protocol.     Total Treatment Billable Duration:  55 minutes  Time In: 1630  Time Out: 5 Carol Rojas, PT       Charge Capture  }Post Session Pain  PT Visit Info  Med[employer] Portal  MD Guidelines  Scanned Media  Benefits  MyChart    Future Appointments   Date Time Provider Ana Sebastian   9/19/2022  2:45 PM Hiren Wiggins, PT [facility]   9/21/2022  4:15 PM Hiren Wiggins, PT [facility]   9/27/2022  2:00 PM Hiren Wiggins, PT [facility]   9/30/2022  2:00 PM Hiren Wiggins, PT [facility]

## 2022-09-19 ENCOUNTER — HOSPITAL ENCOUNTER (OUTPATIENT)
Dept: PHYSICAL THERAPY | Age: 37
Setting detail: RECURRING SERIES
Discharge: HOME | End: 2022-09-22
Payer: COMMERCIAL

## 2022-09-19 PROCEDURE — 97016 VASOPNEUMATIC DEVICE THERAPY: CPT

## 2022-09-19 PROCEDURE — 97140 MANUAL THERAPY 1/> REGIONS: CPT

## 2022-09-19 PROCEDURE — 97110 THERAPEUTIC EXERCISES: CPT

## 2022-09-19 NOTE — PROGRESS NOTES
Alen Conti  : 1985  Primary: Bess Martinez  Secondary:  Aurelio [place]  [address] [facility]  [place] [address]  Phone: [phone]  Fax: [phone] Plan Frequency: 2x/week    Plan of Care/Certification Expiration Date: 22      PT Visit Info:  No data recorded   Visit Count:  3   OUTPATIENT PHYSICAL THERAPY:OP NOTE TYPE: Treatment Note 2022       Episode  }Appt Desk             Treatment Diagnosis:  Difficulty in walking, Not elsewhere classified (R26.2)  Other abnormalities of gait and mobility (R26.89)  Medical/Referring Diagnosis:  Left ankle instability [M25.372]  Referring Physician:  Terri Wynne MD MD Orders:  PT Eval and Treat   Date of Onset:  Onset Date: 22     Allergies:   Tramadol  Restrictions/Precautions:  Restrictions/Precautions: Surgical protocol; Weight Bearing  Right Lower Extremity Weight Bearing: Weight Bearing As Tolerated     Interventions Planned (Treatment may consist of any combination of the following):    Current Treatment Recommendations: ROM; Strengthening; Balance training; Functional mobility training; ADL/Self-care training; Neuromuscular re-education; Manual Therapy - Soft Tissue Mobilization; Manual Therapy - Joint Manipulation; Home exercise program; Modalities; Therapeutic activities     Subjective Comments: Reports feeling \"a lot better\" after last week, ankle with significantly less pain. Initial:}     -2/10Post Session:        1/10  Medications Last Reviewed:  2022  Updated Objective Findings:  See evaluation note from today  Treatment   TREATMENT:   THERAPEUTIC ACTIVITY: ( see below for minutes): Therapeutic activities per grid below to improve mobility, strength, balance and coordination. Required minimal visual, verbal, manual and tactile cues to improve independence and safety with daily activities .   THERAPEUTIC EXERCISE: (see below for minutes): Exercises per grid below to improve mobility, strength, balance and coordination. Required minimal verbal and manual cues to promote proper body alignment, promote proper body posture and promote proper body mechanics. Progressed resistance, range, repetitions and complexity of movement as indicated. MANUAL THERAPY: (see below for minutes): Joint mobilization and Soft tissue mobilization was utilized and necessary because of the patient's restricted joint motion, painful spasm, loss of articular motion and restricted motion of soft tissue. MODALITIES: (see below for minutes): to decrease pain   SELF CARE: (see below for minutes): Procedure(s) (per grid) utilized to improve and/or restore self-care/home management as related to dressing, bathing and grooming. Required minimal verbal cueing to facilitate activities of daily living skills and compensatory activities    Date: 9/13/22  9/15/22  9/19/22    Modalities: 15 min  15 min     Gameready  15min  Gameready  15min               Therapeutic Exercise: 25 min 20 min 30 min    HEP review Ankle [place]  X20 CW/CCW Ankle PF/DF  3x10  blue    Ankle pump  2x20 Ankle PF/DF  3x10  blue Seated dynadisc  AP/ML/CW/CCW  X20 ea     Ankle [place]  X20 CW/CCW Seated toe /extension curl  x40 Seated calf raise  3x10    Toe curl/extensions  x30 Toe curls  x30 [place]   x3      Seated toe press  X40  blue                     Proprioceptive Activities:                        Manual Therapy:  10 min 10 min     TCJ AP mobility  Gr II-III  10 min TCJ AP mobility  Gr II-III  10 min         Functional Activities:                                  Treatment/Session Summary:    Treatment Assessment: Gradually progressing foot strengthening in sagittal plane, adding light A/AAROM for frontal plane movements. Muscular fatigue for foot intrinsics and calf at end of session.       Communication/Consultation:  None today  Equipment provided today:  None  Recommendations/Intent for next treatment session: Next visit will focus on decrease swelling, ankle ROM and strengthening per protocol.     Total Treatment Billable Duration:  55 minutes  Time In: 1525  Time Out: 1645    SAL CATHERINE, PT       Charge Capture  }Post Session Pain  PT Visit Info  [facility] Portal  MD Guidelines  Scanned Media  Benefits  MyChart    Future Appointments   Date Time Provider Ana Sebastian   9/21/2022  4:15 PM Aravind Sanders, PT [facility]   9/27/2022  2:00 PM Aravind Sanders, PT [facility]   9/30/2022  2:00 PM Aravind Sanders, PT [facility]

## 2022-09-21 ENCOUNTER — HOSPITAL ENCOUNTER (OUTPATIENT)
Dept: PHYSICAL THERAPY | Age: 37
Setting detail: RECURRING SERIES
Discharge: HOME | End: 2022-09-24
Payer: COMMERCIAL

## 2022-09-21 PROCEDURE — 97140 MANUAL THERAPY 1/> REGIONS: CPT

## 2022-09-21 PROCEDURE — 97016 VASOPNEUMATIC DEVICE THERAPY: CPT

## 2022-09-21 PROCEDURE — 97110 THERAPEUTIC EXERCISES: CPT

## 2022-09-21 NOTE — PROGRESS NOTES
Ras Conti  : 1985  Primary: Sarmad Martinez  Secondary:  Maxene Captain  [address] [facility] [address]  Phone: [phone]  Fax: [phone] Plan Frequency: 2x/week    Plan of Care/Certification Expiration Date: 22      PT Visit Info:  No data recorded   Visit Count:  4   OUTPATIENT PHYSICAL THERAPY:OP NOTE TYPE: Treatment Note 2022       Episode  }Appt Desk             Treatment Diagnosis:  Difficulty in walking, Not elsewhere classified (R26.2)  Other abnormalities of gait and mobility (R26.89)  Medical/Referring Diagnosis:  Left ankle instability [M25.372]  Referring Physician:  Dennie Oak, MD MD Orders:  PT Eval and Treat   Date of Onset:  Onset Date: 22     Allergies:   Tramadol  Restrictions/Precautions:  Restrictions/Precautions: Surgical protocol; Weight Bearing  Right Lower Extremity Weight Bearing: Weight Bearing As Tolerated     Interventions Planned (Treatment may consist of any combination of the following):    Current Treatment Recommendations: ROM; Strengthening; Balance training; Functional mobility training; ADL/Self-care training; Neuromuscular re-education; Manual Therapy - Soft Tissue Mobilization; Manual Therapy - Joint Manipulation; Home exercise program; Modalities; Therapeutic activities     Subjective Comments: Feeling significantly better, no pain with walking in boot. Initial:}     -2/10Post Session:        1/10  Medications Last Reviewed:  2022  Updated Objective Findings:  See evaluation note from today  Treatment   TREATMENT:   THERAPEUTIC ACTIVITY: ( see below for minutes): Therapeutic activities per grid below to improve mobility, strength, balance and coordination. Required minimal visual, verbal, manual and tactile cues to improve independence and safety with daily activities .   THERAPEUTIC EXERCISE: (see below for minutes): Exercises per grid below to improve mobility, strength, balance and coordination. Required minimal verbal and manual cues to promote proper body alignment, promote proper body posture and promote proper body mechanics. Progressed resistance, range, repetitions and complexity of movement as indicated. MANUAL THERAPY: (see below for minutes): Joint mobilization and Soft tissue mobilization was utilized and necessary because of the patient's restricted joint motion, painful spasm, loss of articular motion and restricted motion of soft tissue. MODALITIES: (see below for minutes): to decrease pain   SELF CARE: (see below for minutes): Procedure(s) (per grid) utilized to improve and/or restore self-care/home management as related to dressing, bathing and grooming. Required minimal verbal cueing to facilitate activities of daily living skills and compensatory activities    Date: 9/13/22  9/15/22  9/19/22  9/21/22    Modalities: 15 min  15 min  15 min    Gameready  15min  Gameready  15min Gameready  15min                 Therapeutic Exercise: 25 min 20 min 30 min     HEP review Ankle [place]  X20 CW/CCW Ankle PF/DF  3x10  blue Ankle PF/DF  3x10  blue    Ankle pump  2x20 Ankle PF/DF  3x10  blue Seated dynadisc  AP/ML/CW/CCW  X20 ea  Ankle evr/inv  2x10 ea  Blue     Ankle [place]  X20 CW/CCW Seated toe /extension curl  x40 Seated calf raise  3x10 Seated toe press  X40  blue    Toe curl/extensions  x30 Toe curls  x30 [place]   x3 Seated calf raise  3x10      Seated toe press  X40  blue Bike  5 min        Seated dynadisc  AP/ML/CW/CCW  X20 ea                 Proprioceptive Activities:                            Manual Therapy:  10 min 10 min 10 min     TCJ AP mobility  Gr II-III  10 min TCJ AP mobility  Gr II-III  10 min TCJ AP mobility  Gr II-III  10 min          Functional Activities:                                       Treatment/Session Summary:    Treatment Assessment: Continuing gradual progressions of ankle/foot strengthening, no complaints of ankle pain.  Continues to fatigue with continued strengthening. Communication/Consultation:  None today  Equipment provided today:  None  Recommendations/Intent for next treatment session: Next visit will focus on decrease swelling, ankle ROM and strengthening per protocol.     Total Treatment Billable Duration:  53 minutes  Time In: 7407  Time Out: 7137    SAL CATHERINE, PT       Charge Capture  }Post Session Pain  PT Visit Info  [place] Portal  MD Guidelines  Scanned Media  Benefits  MyChart    Future Appointments   Date Time Provider Ana Sebastian   9/27/2022  2:00 PM Griselda Kava, PT [facility]   9/30/2022  2:00 PM Griselda Kava, PT [facility]

## 2022-09-27 ENCOUNTER — HOSPITAL ENCOUNTER (OUTPATIENT)
Dept: PHYSICAL THERAPY | Age: 37
Setting detail: RECURRING SERIES
Discharge: HOME | End: 2022-09-30
Payer: COMMERCIAL

## 2022-09-27 PROCEDURE — 97140 MANUAL THERAPY 1/> REGIONS: CPT

## 2022-09-27 PROCEDURE — 97016 VASOPNEUMATIC DEVICE THERAPY: CPT

## 2022-09-27 PROCEDURE — 97110 THERAPEUTIC EXERCISES: CPT

## 2022-09-27 NOTE — PROGRESS NOTES
Frannie Yasmin Conti  : 1985  Primary: Fadi Martinez  Secondary:  Valerie Victoria  [address] [facility] [address]  Phone: [phone]  Fax: [phone] Plan Frequency: 2x/week    Plan of Care/Certification Expiration Date: 22      PT Visit Info:  No data recorded   Visit Count:  5   OUTPATIENT PHYSICAL THERAPY:OP NOTE TYPE: Treatment Note 2022       Episode  }Appt Desk             Treatment Diagnosis:  Difficulty in walking, Not elsewhere classified (R26.2)  Other abnormalities of gait and mobility (R26.89)  Medical/Referring Diagnosis:  Left ankle instability [M25.372]  Referring Physician:  Romi Hayes MD MD Orders:  PT Eval and Treat   Date of Onset:  Onset Date: 22     Allergies:   Tramadol  Restrictions/Precautions:  Restrictions/Precautions: Surgical protocol; Weight Bearing  Right Lower Extremity Weight Bearing: Weight Bearing As Tolerated     Interventions Planned (Treatment may consist of any combination of the following):    Current Treatment Recommendations: ROM; Strengthening; Balance training; Functional mobility training; ADL/Self-care training; Neuromuscular re-education; Manual Therapy - Soft Tissue Mobilization; Manual Therapy - Joint Manipulation; Home exercise program; Modalities; Therapeutic activities     Subjective Comments: Reports muscle soreness with exercises and no foot/ankle pain. Initial:}     -2/10Post Session:        1/10  Medications Last Reviewed:  2022  Updated Objective Findings:  See evaluation note from today  Treatment   TREATMENT:   THERAPEUTIC ACTIVITY: ( see below for minutes): Therapeutic activities per grid below to improve mobility, strength, balance and coordination. Required minimal visual, verbal, manual and tactile cues to improve independence and safety with daily activities .   THERAPEUTIC EXERCISE: (see below for minutes): Exercises per grid below to improve mobility, strength, balance and coordination. Required minimal verbal and manual cues to promote proper body alignment, promote proper body posture and promote proper body mechanics. Progressed resistance, range, repetitions and complexity of movement as indicated. MANUAL THERAPY: (see below for minutes): Joint mobilization and Soft tissue mobilization was utilized and necessary because of the patient's restricted joint motion, painful spasm, loss of articular motion and restricted motion of soft tissue. MODALITIES: (see below for minutes): to decrease pain   SELF CARE: (see below for minutes): Procedure(s) (per grid) utilized to improve and/or restore self-care/home management as related to dressing, bathing and grooming. Required minimal verbal cueing to facilitate activities of daily living skills and compensatory activities    Date: 9/19/22 9/21/22 9/27/22    Modalities: 15 min  15 min 15 min    Gameready  15min Gameready  15min Gameready  15min               Therapeutic Exercise: 30 min  28 min    Ankle PF/DF  3x10  blue Ankle PF/DF  3x10  blue Ankle PF/DF  3x10  blue    Seated dynadisc  AP/ML/CW/CCW  X20 ea  Ankle evr/inv  2x10 ea  Blue  Ankle evr/inv  2x10 ea  Blue     Seated calf raise  3x10 Seated toe press  X40  blue Seated dynadisc  X30 CW/CCW    [place]   x3 Seated calf raise  3x10 Bike  5 min    Seated toe press  X40  blue Bike  5 min  Seated toe press  X40  blue     Seated dynadisc  AP/ML/CW/CCW  X20 ea Intrinsic doming  x40      Seated toe extensions  x40         Proprioceptive Activities:                        Manual Therapy: 10 min 10 min 10 min     TCJ AP mobility  Gr II-III  10 min TCJ AP mobility  Gr II-III  10 min TCJ AP mobility  Gr II-III  10 min         Functional Activities:                                  Treatment/Session Summary:    Treatment Assessment: Improved foot motor control with intrinsic activities. Goal is to progress patient to ASO next visit and practice gait training out of walking boot. Communication/Consultation:  Spoke with PA in regards to progressing patient out of boot and into strap up brace at next PT visit. Equipment provided today:  None  Recommendations/Intent for next treatment session: Next visit will focus on decrease swelling, ankle ROM and strengthening per protocol.     Total Treatment Billable Duration:  53 minutes  Time In: 5543  Time Out: 9409    SAL CATHERINE, PT       Charge Capture  }Post Session Pain  PT Visit Info  [facility] Portal  MD Guidelines  Scanned Media  Benefits  MyChart    Future Appointments   Date Time Provider Ana Sebastian   9/30/2022  2:00 PM [facility]

## 2022-10-05 ENCOUNTER — HOSPITAL ENCOUNTER (OUTPATIENT)
Dept: PHYSICAL THERAPY | Age: 37
Setting detail: RECURRING SERIES
End: 2022-10-05
Payer: COMMERCIAL

## 2022-10-07 ENCOUNTER — HOSPITAL ENCOUNTER (OUTPATIENT)
Dept: PHYSICAL THERAPY | Age: 37
Setting detail: RECURRING SERIES
End: 2022-10-07
Payer: COMMERCIAL

## 2022-10-07 NOTE — PROGRESS NOTES
OUTPATIENT DAILY NOTE    NAME/AGE/GENDER: Rachelle Sofia is a 40 y.o. male. DATE: 10/7/2022    Mr. Conti CANCELED for today's appointment due to Marciano .     Farrel Schlatter, PT

## 2022-10-10 ENCOUNTER — HOSPITAL ENCOUNTER (OUTPATIENT)
Dept: PHYSICAL THERAPY | Age: 37
Setting detail: RECURRING SERIES
Discharge: HOME | End: 2022-10-13
Payer: COMMERCIAL

## 2022-10-10 PROCEDURE — 97110 THERAPEUTIC EXERCISES: CPT

## 2022-10-10 PROCEDURE — 97016 VASOPNEUMATIC DEVICE THERAPY: CPT

## 2022-10-10 NOTE — PROGRESS NOTES
Simi Conti  : 1985  Primary: Sascha Martinez  Secondary:  Rubio Busby  [address] [facility] [address]  Phone: [phone]  Fax: [phone] Plan Frequency: 2x/week    Plan of Care/Certification Expiration Date: 22      PT Visit Info:  No data recorded   Visit Count:  6   OUTPATIENT PHYSICAL THERAPY:OP NOTE TYPE: Treatment Note 10/10/2022       Episode  }Appt Desk             Treatment Diagnosis:  Difficulty in walking, Not elsewhere classified (R26.2)  Other abnormalities of gait and mobility (R26.89)  Medical/Referring Diagnosis:  Left ankle instability [M25.372]  Referring Physician:  Kristy Mendiola MD MD Orders:  PT Eval and Treat   Date of Onset:  Onset Date: 22     Allergies:   Tramadol  Restrictions/Precautions:  Restrictions/Precautions: Surgical protocol; Weight Bearing  Right Lower Extremity Weight Bearing: Weight Bearing As Tolerated     Interventions Planned (Treatment may consist of any combination of the following):    Current Treatment Recommendations: ROM; Strengthening; Balance training; Functional mobility training; ADL/Self-care training; Neuromuscular re-education; Manual Therapy - Soft Tissue Mobilization; Manual Therapy - Joint Manipulation; Home exercise program; Modalities; Therapeutic activities     Subjective Comments: Patient had COVID last week which is why he couldn't attend PT. Overall foot has been feeling a little better, has walked small distances at home without boot on. Initial:}     -2/10Post Session:        1/10  Medications Last Reviewed:  10/10/2022  Updated Objective Findings:  See evaluation note from today  Treatment   TREATMENT:   THERAPEUTIC ACTIVITY: ( see below for minutes): Therapeutic activities per grid below to improve mobility, strength, balance and coordination. Required minimal visual, verbal, manual and tactile cues to improve independence and safety with daily activities .   THERAPEUTIC EXERCISE: (see below for minutes): Exercises per grid below to improve mobility, strength, balance and coordination. Required minimal verbal and manual cues to promote proper body alignment, promote proper body posture and promote proper body mechanics. Progressed resistance, range, repetitions and complexity of movement as indicated. MANUAL THERAPY: (see below for minutes): Joint mobilization and Soft tissue mobilization was utilized and necessary because of the patient's restricted joint motion, painful spasm, loss of articular motion and restricted motion of soft tissue. MODALITIES: (see below for minutes): to decrease pain   SELF CARE: (see below for minutes): Procedure(s) (per grid) utilized to improve and/or restore self-care/home management as related to dressing, bathing and grooming.  Required minimal verbal cueing to facilitate activities of daily living skills and compensatory activities    Date: 9/19/22  9/21/22  9/27/22  10/10/22    Modalities: 15 min  15 min 15 min 15 minmin    Gameready  15min Gameready  15min Gameready  15min Gameready  15min                 Therapeutic Exercise: 30 min  28 min 40 min    Ankle PF/DF  3x10  blue Ankle PF/DF  3x10  blue Ankle PF/DF  3x10  blue Ankle PF/DF  3x10  blue    Seated dynadisc  AP/ML/CW/CCW  X20 ea  Ankle evr/inv  2x10 ea  Blue  Ankle evr/inv  2x10 ea  Blue  Ankle evr/inv  3x10 ea  Blue     Seated calf raise  3x10 Seated toe press  X40  blue Seated dynadisc  X30 CW/CCW Seated calf raise  3x10    [place]   x3 Seated calf raise  3x10 Bike  5 min Standing calf raise  3x10    Seated toe press  X40  blue Bike  5 min  Seated toe press  X40  blue      Seated dynadisc  AP/ML/CW/CCW  X20 ea Intrinsic doming  x40 Weight shift on air ex  x30      Seated toe extensions  x40 Standing toe press  X40  blue       Standing hip ABD  3x10 B   Proprioceptive Activities:                            Manual Therapy: 10 min 10 min 10 min      TCJ AP mobility  Gr II-III  10 min TCJ AP mobility  Gr II-III  10 min TCJ AP mobility  Gr II-III  10 min           Functional Activities:                                       Treatment/Session Summary:    Treatment Assessment: Transitioned into ASO for ambulation today with educating patient on weaning from boot at home when using ASO, patient verbally agreeing. Communication/Consultation:  None today  Equipment provided today:  None  Recommendations/Intent for next treatment session: Next visit will focus on decrease swelling, ankle ROM and strengthening per protocol.     Total Treatment Billable Duration:  55 minutes  Time In: 9404  Time Out: JEFFY Cabral       Charge Capture  }Post Session Pain  PT Visit Info  [employer] Portal  MD Guidelines  Scanned Media  Benefits  MyChart    Future Appointments   Date Time Provider Ana Sebastian   10/12/2022  2:45 PM Jung Del Toro, PT [facility]   10/17/2022  2:00 PM Jung Del Toro, PT [facility]   10/25/2022  2:00 PM Jung Del Toro PT [facility]   10/26/2022  2:00 PM Jung Del Toro, PT [facility]   10/31/2022  2:00 PM Jung Del Toro, PT [facility]

## 2022-10-12 ENCOUNTER — HOSPITAL ENCOUNTER (OUTPATIENT)
Dept: PHYSICAL THERAPY | Age: 37
Setting detail: RECURRING SERIES
Discharge: HOME | End: 2022-10-15
Payer: COMMERCIAL

## 2022-10-12 PROCEDURE — 97016 VASOPNEUMATIC DEVICE THERAPY: CPT

## 2022-10-12 PROCEDURE — 97110 THERAPEUTIC EXERCISES: CPT

## 2022-10-12 PROCEDURE — 97140 MANUAL THERAPY 1/> REGIONS: CPT

## 2022-10-12 NOTE — PROGRESS NOTES
Ivis Conti  : 1985  Primary: Donaciano Seip Sc  Secondary:  Massiel Maynard  [address] [facility] [address]  Phone: [phone]  Fax: [phone] Plan Frequency: 2x/week    Plan of Care/Certification Expiration Date: 22      PT Visit Info:  No data recorded   Visit Count:  7   OUTPATIENT PHYSICAL THERAPY:OP NOTE TYPE: Treatment Note 10/12/2022       Episode  }Appt Desk             Treatment Diagnosis:  Difficulty in walking, Not elsewhere classified (R26.2)  Other abnormalities of gait and mobility (R26.89)  Medical/Referring Diagnosis:  Left ankle instability [M25.372]  Referring Physician:  Brian Mcdonald MD MD Orders:  PT Eval and Treat   Date of Onset:  Onset Date: 22     Allergies:   Tramadol  Restrictions/Precautions:  Restrictions/Precautions: Surgical protocol; Weight Bearing  Right Lower Extremity Weight Bearing: Weight Bearing As Tolerated     Interventions Planned (Treatment may consist of any combination of the following):    Current Treatment Recommendations: ROM; Strengthening; Balance training; Functional mobility training; ADL/Self-care training; Neuromuscular re-education; Manual Therapy - Soft Tissue Mobilization; Manual Therapy - Joint Manipulation; Home exercise program; Modalities; Therapeutic activities     Subjective Comments: Reports walking feels \"weird\" but enjoys wearing the ASO. Reports not having a good shoe to wear it with so is wearing walking boot at work. Initial:}     -2/10Post Session:        1/10  Medications Last Reviewed:  10/12/2022  Updated Objective Findings:  See evaluation note from today  Treatment   TREATMENT:   THERAPEUTIC ACTIVITY: ( see below for minutes): Therapeutic activities per grid below to improve mobility, strength, balance and coordination. Required minimal visual, verbal, manual and tactile cues to improve independence and safety with daily activities .   THERAPEUTIC EXERCISE: (see below for minutes): Exercises per grid below to improve mobility, strength, balance and coordination. Required minimal verbal and manual cues to promote proper body alignment, promote proper body posture and promote proper body mechanics. Progressed resistance, range, repetitions and complexity of movement as indicated. MANUAL THERAPY: (see below for minutes): Joint mobilization and Soft tissue mobilization was utilized and necessary because of the patient's restricted joint motion, painful spasm, loss of articular motion and restricted motion of soft tissue. MODALITIES: (see below for minutes): to decrease pain   SELF CARE: (see below for minutes): Procedure(s) (per grid) utilized to improve and/or restore self-care/home management as related to dressing, bathing and grooming.  Required minimal verbal cueing to facilitate activities of daily living skills and compensatory activities    Date: 9/27/22  10/10/22  10/12/22    Modalities: 15 min 15 minmin 15 min    Gameready  15min Gameready  15min Gameready  15min               Therapeutic Exercise: 28 min 40 min 30 min    Ankle PF/DF  3x10  blue Ankle PF/DF  3x10  blue Bike  5 min    Ankle evr/inv  2x10 ea  Blue  Ankle evr/inv  3x10 ea  Blue  Ankle PF/DF  3x10  blue    Seated dynadisc  X30 CW/CCW Seated calf raise  3x10 Ankle evr/inv  3x10 ea  Blue     Bike  5 min Standing calf raise  3x10 Seated calf raise  3x10  15#    Seated toe press  X40  blue  Standing toe press  X40  blue    Intrinsic doming  x40 Weight shift on air ex  x30 Standing calf raise  3x10     Seated toe extensions  x40 Standing toe press  X40  blue Standing march  4x10     Standing hip ABD  3x10 B    Proprioceptive Activities:                        Manual Therapy: 10 min   10 min    TCJ AP mobility  Gr II-III  10 min  TCJ AP mobility  Gr II-III  10 min         Functional Activities:                                  Treatment/Session Summary:    Treatment Assessment: Continuing to work into standing activities without boot, no ankle pain but increased difficulty and fatigue with continued exercise. Communication/Consultation:  None today  Equipment provided today:  None  Recommendations/Intent for next treatment session: Next visit will focus on decrease swelling, ankle ROM and strengthening per protocol.     Total Treatment Billable Duration:  55 minutes  Time In: 5510  Time Out: 1605    SAL CATHERINE, PT       Charge Capture  }Post Session Pain  PT Visit Info  [employer] Portal  MD Guidelines  Scanned Media  Benefits  MyChart    Future Appointments   Date Time Provider Ana Sebastian   10/17/2022  2:00 PM Carmen Bourgeois, PT [facility]   10/25/2022  2:00 PM Carmen Bourgeois, PT [facility]   10/26/2022  2:00 PM Carmen Bourgeois, PT [facility] [place] [facility]   10/31/2022  2:00 PM Carmen Bourgeois, PT [facility]

## 2022-10-17 ENCOUNTER — HOSPITAL ENCOUNTER (OUTPATIENT)
Dept: PHYSICAL THERAPY | Age: 37
Setting detail: RECURRING SERIES
Discharge: HOME | End: 2022-10-20
Payer: COMMERCIAL

## 2022-10-17 PROCEDURE — 97110 THERAPEUTIC EXERCISES: CPT

## 2022-10-17 PROCEDURE — 97140 MANUAL THERAPY 1/> REGIONS: CPT

## 2022-10-17 NOTE — PROGRESS NOTES
Alysa Conti  : 1985  Primary: Lala Tse Sc  Secondary:  Cynthia Nicole  [address] [facility] [address]  Phone: [phone]  Fax: [phone] Plan Frequency: 2x/week    Plan of Care/Certification Expiration Date: 22      PT Visit Info:  No data recorded   Visit Count:  8   OUTPATIENT PHYSICAL THERAPY:OP NOTE TYPE: Treatment Note 10/17/2022       Episode  }Appt Desk             Treatment Diagnosis:  Difficulty in walking, Not elsewhere classified (R26.2)  Other abnormalities of gait and mobility (R26.89)  Medical/Referring Diagnosis:  Left ankle instability [M25.372]  Referring Physician:  Fortunato Gilbert MD MD Orders:  PT Eval and Treat   Date of Onset:  Onset Date: 22     Allergies:   Tramadol  Restrictions/Precautions:  Restrictions/Precautions: Surgical protocol; Weight Bearing  Right Lower Extremity Weight Bearing: Weight Bearing As Tolerated     Interventions Planned (Treatment may consist of any combination of the following):    Current Treatment Recommendations: ROM; Strengthening; Balance training; Functional mobility training; ADL/Self-care training; Neuromuscular re-education; Manual Therapy - Soft Tissue Mobilization; Manual Therapy - Joint Manipulation; Home exercise program; Modalities; Therapeutic activities     Subjective Comments: Walking as been feeling more normal.     Initial:}     -2/10Post Session:        1/10  Medications Last Reviewed:  10/17/2022  Updated Objective Findings:  See evaluation note from today  Treatment   TREATMENT:   THERAPEUTIC ACTIVITY: ( see below for minutes): Therapeutic activities per grid below to improve mobility, strength, balance and coordination. Required minimal visual, verbal, manual and tactile cues to improve independence and safety with daily activities . THERAPEUTIC EXERCISE: (see below for minutes): Exercises per grid below to improve mobility, strength, balance and coordination.  Required minimal verbal and manual cues to promote proper body alignment, promote proper body posture and promote proper body mechanics. Progressed resistance, range, repetitions and complexity of movement as indicated. MANUAL THERAPY: (see below for minutes): Joint mobilization and Soft tissue mobilization was utilized and necessary because of the patient's restricted joint motion, painful spasm, loss of articular motion and restricted motion of soft tissue. MODALITIES: (see below for minutes): to decrease pain   SELF CARE: (see below for minutes): Procedure(s) (per grid) utilized to improve and/or restore self-care/home management as related to dressing, bathing and grooming.  Required minimal verbal cueing to facilitate activities of daily living skills and compensatory activities    Date: 9/27/22  10/10/22  10/12/22  10/17/22    Modalities: 15 min 15 minmin 15 min     Gameready  15min Gameready  15min Gameready  15min                  Therapeutic Exercise: 28 min 40 min 30 min 30 min    Ankle PF/DF  3x10  blue Ankle PF/DF  3x10  blue Bike  5 min     Ankle evr/inv  2x10 ea  Blue  Ankle evr/inv  3x10 ea  Blue  Ankle PF/DF  3x10  blue Ankle PF/DF  3x10  blue    Seated dynadisc  X30 CW/CCW Seated calf raise  3x10 Ankle evr/inv  3x10 ea  Blue  Ankle evr/inv  3x10 ea  Blue     Bike  5 min Standing calf raise  3x10 Seated calf raise  3x10  15# Seated calf raise  3x10  20#    Seated toe press  X40  blue  Standing toe press  X40  blue Standing toe press  X40  blue    Intrinsic doming  x40 Weight shift on air ex  x30 Standing calf raise  3x10  Standing calf raise  3x10     Seated toe extensions  x40 Standing toe press  X40  blue Standing march  4x10 STS  3x10     Standing hip ABD  3x10 B  Step up march  3x10  6\" box   Proprioceptive Activities:                            Manual Therapy: 10 min   10 min 10 min    TCJ AP mobility  Gr II-III  10 min  TCJ AP mobility  Gr II-III  10 min TCJ AP mobility  Gr II-III  10 min Functional Activities:                                       Treatment/Session Summary:    Treatment Assessment: Working CKC strengthening while adding STS and step up marches, patient with increased difficulty with step ups due to difficulty with SLS. Communication/Consultation:  None today  Equipment provided today:  None  Recommendations/Intent for next treatment session: Next visit will focus on decrease swelling, ankle ROM and strengthening per protocol.     Total Treatment Billable Duration:  40 minutes  Time In: 0261  Time Out: 1446    SAL CATHERINE, PT       Charge Capture  }Post Session Pain  PT Visit Info  [employer] Portal  MD Guidelines  Scanned Media  Benefits  MyChart    Future Appointments   Date Time Provider nAa Sebastian   10/19/2022  2:00 PM Gus Hy, PT [facility]   10/25/2022  2:00 PM Gus Hy, PT [facility]   10/26/2022  2:00 PM Gus Hy, PT [facility] [place]   10/31/2022  2:00 PM Gus Hy, PT [facility]

## 2022-10-19 ENCOUNTER — HOSPITAL ENCOUNTER (OUTPATIENT)
Dept: PHYSICAL THERAPY | Age: 37
Setting detail: RECURRING SERIES
Discharge: HOME | End: 2022-10-22
Payer: COMMERCIAL

## 2022-10-19 PROCEDURE — 97016 VASOPNEUMATIC DEVICE THERAPY: CPT

## 2022-10-19 PROCEDURE — 97110 THERAPEUTIC EXERCISES: CPT

## 2022-10-19 PROCEDURE — 97140 MANUAL THERAPY 1/> REGIONS: CPT

## 2022-10-19 NOTE — PROGRESS NOTES
Donald Conti  : 1985  Primary: Mick Zaragoza Sc  Secondary:  Joe Alan  [address] [facility] [address]  Phone: [phone]  Fax: [phone] Plan Frequency: 2x/week    Plan of Care/Certification Expiration Date: 22      PT Visit Info:  No data recorded   Visit Count:  9   OUTPATIENT PHYSICAL THERAPY:OP NOTE TYPE: Treatment Note 10/19/2022       Episode  }Appt Desk             Treatment Diagnosis:  Difficulty in walking, Not elsewhere classified (R26.2)  Other abnormalities of gait and mobility (R26.89)  Medical/Referring Diagnosis:  Left ankle instability [M25.372]  Referring Physician:  Barry Wilcox MD MD Orders:  PT Eval and Treat   Date of Onset:  Onset Date: 22     Allergies:   Tramadol  Restrictions/Precautions:  Restrictions/Precautions: Surgical protocol; Weight Bearing  Right Lower Extremity Weight Bearing: Weight Bearing As Tolerated     Interventions Planned (Treatment may consist of any combination of the following):    Current Treatment Recommendations: ROM; Strengthening; Balance training; Functional mobility training; ADL/Self-care training; Neuromuscular re-education; Manual Therapy - Soft Tissue Mobilization; Manual Therapy - Joint Manipulation; Home exercise program; Modalities; Therapeutic activities     Subjective Comments: Foot has been feeling better with less pain when walking around the house. Initial:}     -2/10Post Session:        1/10  Medications Last Reviewed:  10/19/2022  Updated Objective Findings:  See evaluation note from today  Treatment   TREATMENT:   THERAPEUTIC ACTIVITY: ( see below for minutes): Therapeutic activities per grid below to improve mobility, strength, balance and coordination. Required minimal visual, verbal, manual and tactile cues to improve independence and safety with daily activities .   THERAPEUTIC EXERCISE: (see below for minutes): Exercises per grid below to improve mobility, strength, balance and coordination. Required minimal verbal and manual cues to promote proper body alignment, promote proper body posture and promote proper body mechanics. Progressed resistance, range, repetitions and complexity of movement as indicated. MANUAL THERAPY: (see below for minutes): Joint mobilization and Soft tissue mobilization was utilized and necessary because of the patient's restricted joint motion, painful spasm, loss of articular motion and restricted motion of soft tissue. MODALITIES: (see below for minutes): to decrease pain   SELF CARE: (see below for minutes): Procedure(s) (per grid) utilized to improve and/or restore self-care/home management as related to dressing, bathing and grooming.  Required minimal verbal cueing to facilitate activities of daily living skills and compensatory activities    Date: 10/12/22  10/17/22  10/19/22     Modalities: 15 min  15 min    Gameready  15min  Gameready  15 min               Therapeutic Exercise: 30 min 30 min 27 min    Bike  5 min  Bike  5 min    Ankle PF/DF  3x10  blue Ankle PF/DF  3x10  blue Ankle DF/INV/EVR  3x10 ea  blue    Ankle evr/inv  3x10 ea  Blue  Ankle evr/inv  3x10 ea  Blue  Seated calf raise  3x10  20#    Seated calf raise  3x10  15# Seated calf raise  3x10  20# Standing calf raise  3x10    Standing toe press  X40  blue Standing toe press  X40  blue Shuffle gait  2 laps     Standing calf raise  3x10  Standing calf raise  3x10  Step up march  3x10  6\" box    Standing march  4x10 STS  3x10 Tandem balance  3x30\" ea     Step up march  3x10  6\" box    Proprioceptive Activities:                        Manual Therapy: 10 min 10 min 8 min    TCJ AP mobility  Gr II-III  10 min TCJ AP mobility  Gr II-III  10 min TCJ AP mobility  Gr II-III  8 min         Functional Activities:                                  Treatment/Session Summary:    Treatment Assessment: Progressing CKC movements today, adding tandem balance on firm surface and shuffle gait movement to promote intrinsic strengthening. Communication/Consultation:  None today  Equipment provided today:  None  Recommendations/Intent for next treatment session: Next visit will focus on decrease swelling, ankle ROM and strengthening per protocol.     Total Treatment Billable Duration:  50 minutes  Time In: 5577  Time Out: 1500    SAL CATHERINE, PT       Charge Capture  }Post Session Pain  PT Visit Info  [employer] Portal  MD Guidelines  Scanned Media  Benefits  MyChart    Future Appointments   Date Time Provider Ana Sebastian   10/25/2022  2:00 PM Lucho Ruiz, PT [facility]   10/26/2022  2:00 PM Lucho Ruiz, PT [facility]   10/31/2022  2:00 PM Lucho Ruiz, PT [facility]

## 2022-10-25 ENCOUNTER — HOSPITAL ENCOUNTER (OUTPATIENT)
Dept: PHYSICAL THERAPY | Age: 37
Setting detail: RECURRING SERIES
Discharge: HOME | End: 2022-10-28
Payer: COMMERCIAL

## 2022-10-25 PROCEDURE — 97140 MANUAL THERAPY 1/> REGIONS: CPT

## 2022-10-25 PROCEDURE — 97110 THERAPEUTIC EXERCISES: CPT

## 2022-10-25 PROCEDURE — 97016 VASOPNEUMATIC DEVICE THERAPY: CPT

## 2022-10-25 NOTE — PROGRESS NOTES
Ella Conti  : 1985  Primary: Moraima Martinez  Secondary:  Isabel Sabillon  [address] [facility] [address]  Phone: [phone]  Fax: [phone] Plan Frequency: 2x/week    Plan of Care/Certification Expiration Date: 22      PT Visit Info:  No data recorded   Visit Count:  10   OUTPATIENT PHYSICAL YSHRMWF:TU NOTE TYPE: Progress Note and Treatment Note 10/25/2022       Episode  }Appt Desk             Treatment Diagnosis:  Difficulty in walking, Not elsewhere classified (R26.2)  Other abnormalities of gait and mobility (R26.89)  Medical/Referring Diagnosis:  Left ankle instability [M25.372]  Referring Physician:  Emerson Lanza MD MD Orders:  PT Eval and Treat   Date of Onset:  Onset Date: 22     Allergies:   Tramadol  Restrictions/Precautions:  Restrictions/Precautions: Surgical protocol; Weight Bearing  Right Lower Extremity Weight Bearing: Weight Bearing As Tolerated     Interventions Planned (Treatment may consist of any combination of the following):    Current Treatment Recommendations: ROM; Strengthening; Balance training; Functional mobility training; ADL/Self-care training; Neuromuscular re-education; Manual Therapy - Soft Tissue Mobilization; Manual Therapy - Joint Manipulation; Home exercise program; Modalities; Therapeutic activities     Subjective Comments: Reports walking without any pain now, increased confidence moving around at home. Initial:}     -2/10Post Session:        1/10  Medications Last Reviewed:  10/25/2022  Updated Objective Findings:  PN 10/25/22   Observation  Incision C/D/I, no redness observed.      SL balance:  L 16.3 seconds  Tandem balance: R > 30 seconds, L 20 seconds     Girth measurements (R/L in cm)  Figure 8                       54/54.3     ROM (R/L in °) AROM(PROM)  Ankle DF                     20/10  Ankle PF                     50/44  Ankle eversion            40/28  Ankle inversion           15/8    Joint mobility  Talocrural: hypomobile AP     Strength (R/L)   Not tested at IE. Palpation  No pitting edema in LE. No warmth    Outcome Measure: Tool Used: FOOT AND ANKLE ABILITY MEASURE  Score:  Initial: 30 Most Recent: 62 (Date: 10/25/22 )     Goals: (Goals have been discussed and agreed upon with patient.)  Short-Term Functional Goals: Time Frame: 2 weeks  Patient will be independent with HEP. (Complete)  Patient will decrease pain to < 5/10. (Complete)  Patient will decrease swelling to be symmetrical to contralateral side. (complete)  Discharge Goals: Time Frame: 8 weeks  Patient will decrease ankle pain to < 2/10 in order to complete full work day. (ongoing)  Patient will increase DF ROM to at least 10 in order to walk without compensations. (complete)  Patient will increase PF strength to 5/5 to improve ability to rise on toes for reaching OH. (Ongoing)  Treatment   TREATMENT:   THERAPEUTIC ACTIVITY: ( see below for minutes): Therapeutic activities per grid below to improve mobility, strength, balance and coordination. Required minimal visual, verbal, manual and tactile cues to improve independence and safety with daily activities . THERAPEUTIC EXERCISE: (see below for minutes): Exercises per grid below to improve mobility, strength, balance and coordination. Required minimal verbal and manual cues to promote proper body alignment, promote proper body posture and promote proper body mechanics. Progressed resistance, range, repetitions and complexity of movement as indicated. MANUAL THERAPY: (see below for minutes): Joint mobilization and Soft tissue mobilization was utilized and necessary because of the patient's restricted joint motion, painful spasm, loss of articular motion and restricted motion of soft tissue.    MODALITIES: (see below for minutes): to decrease pain   SELF CARE: (see below for minutes): Procedure(s) (per grid) utilized to improve and/or restore self-care/home management as related to dressing, bathing and grooming. Required minimal verbal cueing to facilitate activities of daily living skills and compensatory activities    Date: 10/17/22  10/19/22   10/25/22    Modalities:  15 min 15 min     Gameready  15 min Gameready  15 min               Therapeutic Exercise: 30 min 27 min 32 min     Bike  5 min Bike  5 min    Ankle PF/DF  3x10  blue Ankle DF/INV/EVR  3x10 ea  blue Ankle DF/INV/EVR  3x10 ea  blue    Ankle evr/inv  3x10 ea  Blue  Seated calf raise  3x10  20# Shuffle gait  3 laps     Seated calf raise  3x10  20# Standing calf raise  3x10 Standing calf raise  3x10    Standing toe press  X40  blue Shuffle gait  2 laps  STS  3x10    Standing calf raise  3x10  Step up march  3x10  6\" box Tandem balance  3x30\" ea    STS  3x10 Tandem balance  3x30\" ea SL balance  3x15\"      Step up march  3x10  6\" box  Toe walk  3 laps fwd      Slantboard stretch  2x1'               Proprioceptive Activities:                         Manual Therapy: 10 min 8 min 8 min    TCJ AP mobility  Gr II-III  10 min TCJ AP mobility  Gr II-III  8 min TCJ AP mobility  Gr II-III  8 min         Functional Activities:                                  Treatment/Session Summary:    Treatment Assessment: Patient has made objective improvements with decreasing pain, increasing ROM, increasing strength and improving balance leading to leading to improved ability to ambulate, climb stairs, squat, perform light household ADLs. They continue to have impairments with ROM, strength, and balance causing difficulty with walking down hills, ascending stairs, walking > 15 min and performing heavier tasks at home and at work at [employer]. . Patient will continue to benefit from skilled physical therapy to continue improving their above impairments and functional limitations to return to their PLOF.      Communication/Consultation:  None today  Equipment provided today:  None  Recommendations/Intent for next treatment session: Next visit will focus on decrease swelling, ankle ROM and strengthening per protocol.     Total Treatment Billable Duration:  55 minutes  Time In: 4285  Time Out: [address], PT       Charge Capture  }Post Session Pain  PT Visit Info  [facility]  MD Guidelines  Scanned Media  Benefits  MyChart    Future Appointments   Date Time Provider Ana Sebastian   10/26/2022  2:00 PM [facility], PT [facility]   10/31/2022  2:00 PM [facility], PT [facility]   11/2/2022  2:00 PM [facility], PT [facility]   11/7/2022  2:00 PM [facility], PT [facility]   11/9/2022  2:00 PM [facility], PT [facility]   11/14/2022  2:00 PM [facility], PT [facility]   11/16/2022  2:00 PM [facility], PT [facility]   11/21/2022  2:00 PM [facility], PT [facility]   11/23/2022  2:00 PM [facility], PT [facility]   11/28/2022  2:00 PM [facility], PT [facility]   11/30/2022  2:00 PM [facility], PT [facility]

## 2022-10-31 ENCOUNTER — HOSPITAL ENCOUNTER (OUTPATIENT)
Dept: PHYSICAL THERAPY | Age: 37
Setting detail: RECURRING SERIES
End: 2022-10-31
Payer: COMMERCIAL

## 2022-10-31 NOTE — PROGRESS NOTES
OUTPATIENT DAILY NOTE    NAME/AGE/GENDER: Sabina Linares is a 40 y.o. male. DATE: 10/31/2022    Mr. Conti CANCELED for today's appointment due to not having transportation .     Conner Aly, PT

## 2022-11-02 ENCOUNTER — HOSPITAL ENCOUNTER (OUTPATIENT)
Dept: PHYSICAL THERAPY | Age: 37
Setting detail: RECURRING SERIES
Discharge: HOME | End: 2022-11-05
Payer: COMMERCIAL

## 2022-11-02 PROCEDURE — 97140 MANUAL THERAPY 1/> REGIONS: CPT

## 2022-11-02 PROCEDURE — 97016 VASOPNEUMATIC DEVICE THERAPY: CPT

## 2022-11-02 PROCEDURE — 97110 THERAPEUTIC EXERCISES: CPT

## 2022-11-02 NOTE — PROGRESS NOTES
Simi Conti  : 1985  Primary: Sascha Gutierrez Sc  Secondary:  Rubio Busby  [address] [facility] [address]  Phone: [phone]  Fax: [phone] Plan Frequency: 2x/week    Plan of Care/Certification Expiration Date: 22      PT Visit Info:  No data recorded   Visit Count:  11   OUTPATIENT PHYSICAL YBQTUMZ:FG NOTE TYPE: Treatment Note 2022       Episode  }Appt Desk             Treatment Diagnosis:  Difficulty in walking, Not elsewhere classified (R26.2)  Other abnormalities of gait and mobility (R26.89)  Medical/Referring Diagnosis:  Left ankle instability [M25.372]  Referring Physician:  Kristy Mendiola MD MD Orders:  PT Eval and Treat   Date of Onset:  Onset Date: 22     Allergies:   Tramadol  Restrictions/Precautions:  Restrictions/Precautions: Surgical protocol; Weight Bearing  Right Lower Extremity Weight Bearing: Weight Bearing As Tolerated     Interventions Planned (Treatment may consist of any combination of the following):    Current Treatment Recommendations: ROM; Strengthening; Balance training; Functional mobility training; ADL/Self-care training; Neuromuscular re-education; Manual Therapy - Soft Tissue Mobilization; Manual Therapy - Joint Manipulation; Home exercise program; Modalities; Therapeutic activities     Subjective Comments: Reporting feeling more tingling at outside part of foot yesterday. Initial:}     -2/10Post Session:        1/10  Medications Last Reviewed:  2022  Updated Objective Findings:  PN 10/25/22   Observation  Incision C/D/I, no redness observed.      SL balance:  L 16.3 seconds  Tandem balance: R > 30 seconds, L 20 seconds     Girth measurements (R/L in cm)  Figure 8                       54/54.3     ROM (R/L in °) AROM(PROM)  Ankle DF                     20/10  Ankle PF                     50/44  Ankle eversion            40/28  Ankle inversion           15/8    Joint mobility  Talocrural: hypomobile AP     Strength (R/L)   Not tested at IE. Palpation  No pitting edema in LE. No warmth    Outcome Measure: Tool Used: FOOT AND ANKLE ABILITY MEASURE  Score:  Initial: 30 Most Recent: 62 (Date: 10/25/22 )     Goals: (Goals have been discussed and agreed upon with patient.)  Short-Term Functional Goals: Time Frame: 2 weeks  Patient will be independent with HEP. (Complete)  Patient will decrease pain to < 5/10. (Complete)  Patient will decrease swelling to be symmetrical to contralateral side. (complete)  Discharge Goals: Time Frame: 8 weeks  Patient will decrease ankle pain to < 2/10 in order to complete full work day. (ongoing)  Patient will increase DF ROM to at least 10 in order to walk without compensations. (complete)  Patient will increase PF strength to 5/5 to improve ability to rise on toes for reaching OH. (Ongoing)  Treatment   TREATMENT:   THERAPEUTIC ACTIVITY: ( see below for minutes): Therapeutic activities per grid below to improve mobility, strength, balance and coordination. Required minimal visual, verbal, manual and tactile cues to improve independence and safety with daily activities . THERAPEUTIC EXERCISE: (see below for minutes): Exercises per grid below to improve mobility, strength, balance and coordination. Required minimal verbal and manual cues to promote proper body alignment, promote proper body posture and promote proper body mechanics. Progressed resistance, range, repetitions and complexity of movement as indicated. MANUAL THERAPY: (see below for minutes): Joint mobilization and Soft tissue mobilization was utilized and necessary because of the patient's restricted joint motion, painful spasm, loss of articular motion and restricted motion of soft tissue. MODALITIES: (see below for minutes): to decrease pain   SELF CARE: (see below for minutes): Procedure(s) (per grid) utilized to improve and/or restore self-care/home management as related to dressing, bathing and grooming. Required minimal verbal cueing to facilitate activities of daily living skills and compensatory activities    Date: 10/19/22   10/25/22  11/2/22    Modalities: 15 min 15 min 15 min    Gameready  15 min Gameready  15 min Gameready  15 min               Therapeutic Exercise: 27 min 32 min 32 min     Bike  5 min Bike  5 min Shuffle gait  3 laps     Ankle DF/INV/EVR  3x10 ea  blue Ankle DF/INV/EVR  3x10 ea  blue Standing calf raise  3x10    Seated calf raise  3x10  20# Shuffle gait  3 laps  Lateral step up   3x10  10\" box    Standing calf raise  3x10 Standing calf raise  3x10 Step up march  3x10 air ex    Shuffle gait  2 laps  STS  3x10 Standing toe press  X40  blue    Step up march  3x10  6\" box Tandem balance  3x30\" ea Seated calf raise  3x15  35#    Tandem balance  3x30\" ea SL balance  3x15\"        Toe walk  3 laps fwd      Slantboard stretch  2x1'                Proprioceptive Activities:                         Manual Therapy: 8 min 8 min 8 min    TCJ AP mobility  Gr II-III  8 min TCJ AP mobility  Gr II-III  8 min TCJ AP mobility  Gr II-III  8 min         Functional Activities:                                  Treatment/Session Summary:    Treatment Assessment: Progressing strengthening through increasing step height and utilizing frontal plane and unstable surfaces. Communication/Consultation:  None today  Equipment provided today:  None  Recommendations/Intent for next treatment session: Next visit will focus on decrease swelling, ankle ROM and strengthening per protocol.     Total Treatment Billable Duration:  55 minutes  Time In: 2598  Time Out: 1500    SAL CATHERINE PT       Charge Capture  }Post Session Pain  PT Visit Info  [facility] Portal  MD Guidelines  Scanned Media  Benefits  MyChart    Future Appointments   Date Time Provider Ana Sebastian   11/7/2022  2:00 PM Nati Slater, JEFFY [facility]   11/9/2022  2:00 PM Nati Slater PT [facility]   11/14/2022  2:00 PM Nati Slater PT [facility]   11/16/2022  2:00 PM Berto Gould, PT [facility]   11[facility]21[facility]2022  [facility]   11/23[facility]2022  [facility]00 [facility]   11/28/2022  2[facility]00 [facility]   11/30/2022  2:00 PM [facility]

## 2022-11-07 ENCOUNTER — HOSPITAL ENCOUNTER (OUTPATIENT)
Dept: PHYSICAL THERAPY | Age: 37
Setting detail: RECURRING SERIES
Discharge: HOME | End: 2022-11-10
Payer: COMMERCIAL

## 2022-11-07 PROCEDURE — 97016 VASOPNEUMATIC DEVICE THERAPY: CPT

## 2022-11-07 PROCEDURE — 97110 THERAPEUTIC EXERCISES: CPT

## 2022-11-07 NOTE — PROGRESS NOTES
Louie Conti  : 1985  Primary: Seymour Oh Sc  Secondary:  Eduard Perkins  [address] [facility] [address]  Phone: [phone]  Fax: [phone] Plan Frequency: 2x/week    Plan of Care/Certification Expiration Date: 22      PT Visit Info:  No data recorded   Visit Count:  12   OUTPATIENT PHYSICAL THERAPY:OP NOTE TYPE: Treatment Note 2022       Episode  }Appt Desk             Treatment Diagnosis:  Difficulty in walking, Not elsewhere classified (R26.2)  Other abnormalities of gait and mobility (R26.89)  Medical/Referring Diagnosis:  Left ankle instability [M25.372]  Referring Physician:  Addie Robledo MD MD Orders:  PT Eval and Treat   Date of Onset:  Onset Date: 22     Allergies:   Tramadol  Restrictions/Precautions:  Restrictions/Precautions: Surgical protocol; Weight Bearing  Right Lower Extremity Weight Bearing: Weight Bearing As Tolerated     Interventions Planned (Treatment may consist of any combination of the following):    Current Treatment Recommendations: ROM; Strengthening; Balance training; Functional mobility training; ADL/Self-care training; Neuromuscular re-education; Manual Therapy - Soft Tissue Mobilization; Manual Therapy - Joint Manipulation; Home exercise program; Modalities; Therapeutic activities     Subjective Comments: Has been walking more since last visit, feeling more normal.      Initial:}     -2/10Post Session:        1/10  Medications Last Reviewed:  2022  Updated Objective Findings:  PN 10/25/22   Observation  Incision C/D/I, no redness observed.      SL balance:  L 16.3 seconds  Tandem balance: R > 30 seconds, L 20 seconds     Girth measurements (R/L in cm)  Figure 8                       54/54.3     ROM (R/L in °) AROM(PROM)  Ankle DF                     20/10  Ankle PF                     50/44  Ankle eversion            40/28  Ankle inversion           15/8    Joint mobility  Talocrural: hypomobile AP Strength (R/L)   Not tested at IE. Palpation  No pitting edema in LE. No warmth    Outcome Measure: Tool Used: FOOT AND ANKLE ABILITY MEASURE  Score:  Initial: 30 Most Recent: 62 (Date: 10/25/22 )     Goals: (Goals have been discussed and agreed upon with patient.)  Short-Term Functional Goals: Time Frame: 2 weeks  Patient will be independent with HEP. (Complete)  Patient will decrease pain to < 5/10. (Complete)  Patient will decrease swelling to be symmetrical to contralateral side. (complete)  Discharge Goals: Time Frame: 8 weeks  Patient will decrease ankle pain to < 2/10 in order to complete full work day. (ongoing)  Patient will increase DF ROM to at least 10 in order to walk without compensations. (complete)  Patient will increase PF strength to 5/5 to improve ability to rise on toes for reaching OH. (Ongoing)  Treatment   TREATMENT:   THERAPEUTIC ACTIVITY: ( see below for minutes): Therapeutic activities per grid below to improve mobility, strength, balance and coordination. Required minimal visual, verbal, manual and tactile cues to improve independence and safety with daily activities . THERAPEUTIC EXERCISE: (see below for minutes): Exercises per grid below to improve mobility, strength, balance and coordination. Required minimal verbal and manual cues to promote proper body alignment, promote proper body posture and promote proper body mechanics. Progressed resistance, range, repetitions and complexity of movement as indicated. MANUAL THERAPY: (see below for minutes): Joint mobilization and Soft tissue mobilization was utilized and necessary because of the patient's restricted joint motion, painful spasm, loss of articular motion and restricted motion of soft tissue. MODALITIES: (see below for minutes): to decrease pain   SELF CARE: (see below for minutes): Procedure(s) (per grid) utilized to improve and/or restore self-care/home management as related to dressing, bathing and grooming. Required minimal verbal cueing to facilitate activities of daily living skills and compensatory activities    Date: 11/2/22 11/7/22    Modalities: 15 min 15 min    Gameready  15 min Gameready  15 min             Therapeutic Exercise: 32 min  32 min    Shuffle gait  3 laps  Shuffle gait  3 laps     Standing calf raise  3x10 Sidestep band at feet  3 laps  grn    Lateral step up   3x10  10\" box Calf raise  3x10    Step up march  3x10 air ex Toe walk  3 laps     Standing toe press  X40  blue MOBO hold  3x30\"    Seated calf raise  3x15  35# Lateral step up  3x10  6\" box     Step up/march air ex  3x10                       Proprioceptive Activities:                    Manual Therapy: 8 min     TCJ AP mobility  Gr II-III  8 min         Functional Activities:                             Treatment/Session Summary:    Treatment Assessment: Able to progress standing exercise today, fatigues with continued calf strengthening however able to complete with appropriate form. Communication/Consultation:  None today  Equipment provided today:  None  Recommendations/Intent for next treatment session: Next visit will focus on decrease swelling, ankle ROM and strengthening per protocol.     Total Treatment Billable Duration:  47 minutes  Time In: 7666  Time Out: 1450    SAL CATHERINE, PT       Charge Capture  }Post Session Pain  PT Visit Info  [facility] Portal  MD Guidelines  Scanned Media  Benefits  MyChart    Future Appointments   Date Time Provider Ana Sebastian   [facility]2022  [facility]   11/14/2022  2:00 PM Gus Hy, PT [facility]   11/16/2022  2:00 PM Gus Hy, PT [facility]   11/21/2022  2:00 PM Gus Hy, PT [facility]   11/23/2022  2:00 PM Gus Hy, PT [facility]   11/28/2022  2:00 PM Gus Hy, PT [facility]   11/30/2022  2:00 PM Gus Hy, PT [facility]

## 2022-11-09 ENCOUNTER — HOSPITAL ENCOUNTER (OUTPATIENT)
Dept: PHYSICAL THERAPY | Age: 37
Setting detail: RECURRING SERIES
Discharge: HOME | End: 2022-11-12
Payer: COMMERCIAL

## 2022-11-09 PROCEDURE — 97016 VASOPNEUMATIC DEVICE THERAPY: CPT

## 2022-11-09 PROCEDURE — 97110 THERAPEUTIC EXERCISES: CPT

## 2022-11-09 NOTE — PROGRESS NOTES
Sloanepatricia Conti  : 1985  Primary: Lynne Prince Sc  Secondary:  Jose Aguirre  [address] [facility] [address]  Phone: [phone]  Fax: [phone] Plan Frequency: 2x/week    Plan of Care/Certification Expiration Date: 22      PT Visit Info:  No data recorded   Visit Count:  13   OUTPATIENT PHYSICAL BBEPJEI:RD NOTE TYPE: Treatment Note 2022       Episode  }Appt Desk             Treatment Diagnosis:  Difficulty in walking, Not elsewhere classified (R26.2)  Other abnormalities of gait and mobility (R26.89)  Medical/Referring Diagnosis:  Left ankle instability [M25.372]  Referring Physician:  Kate Teixeira MD MD Orders:  PT Eval and Treat   Date of Onset:  Onset Date: 22     Allergies:   Tramadol  Restrictions/Precautions:  Restrictions/Precautions: Surgical protocol; Weight Bearing  Right Lower Extremity Weight Bearing: Weight Bearing As Tolerated     Interventions Planned (Treatment may consist of any combination of the following):    Current Treatment Recommendations: ROM; Strengthening; Balance training; Functional mobility training; ADL/Self-care training; Neuromuscular re-education; Manual Therapy - Soft Tissue Mobilization; Manual Therapy - Joint Manipulation; Home exercise program; Modalities; Therapeutic activities     Subjective Comments: Reports about 1.5 days of muscle soreness after last visit. Initial:}     -2/10Post Session:        1/10  Medications Last Reviewed:  2022  Updated Objective Findings:  PN 10/25/22   Observation  Incision C/D/I, no redness observed.      SL balance:  L 16.3 seconds  Tandem balance: R > 30 seconds, L 20 seconds     Girth measurements (R/L in cm)  Figure 8                       54/54.3     ROM (R/L in °) AROM(PROM)  Ankle DF                     20/10  Ankle PF                     50/44  Ankle eversion            40/28  Ankle inversion           15/8    Joint mobility  Talocrural: hypomobile AP Strength (R/L)   Not tested at IE. Palpation  No pitting edema in LE. No warmth    Outcome Measure: Tool Used: FOOT AND ANKLE ABILITY MEASURE  Score:  Initial: 30 Most Recent: 62 (Date: 10/25/22 )     Goals: (Goals have been discussed and agreed upon with patient.)  Short-Term Functional Goals: Time Frame: 2 weeks  Patient will be independent with HEP. (Complete)  Patient will decrease pain to < 5/10. (Complete)  Patient will decrease swelling to be symmetrical to contralateral side. (complete)  Discharge Goals: Time Frame: 8 weeks  Patient will decrease ankle pain to < 2/10 in order to complete full work day. (ongoing)  Patient will increase DF ROM to at least 10 in order to walk without compensations. (complete)  Patient will increase PF strength to 5/5 to improve ability to rise on toes for reaching OH. (Ongoing)  Treatment   TREATMENT:   THERAPEUTIC ACTIVITY: ( see below for minutes): Therapeutic activities per grid below to improve mobility, strength, balance and coordination. Required minimal visual, verbal, manual and tactile cues to improve independence and safety with daily activities . THERAPEUTIC EXERCISE: (see below for minutes): Exercises per grid below to improve mobility, strength, balance and coordination. Required minimal verbal and manual cues to promote proper body alignment, promote proper body posture and promote proper body mechanics. Progressed resistance, range, repetitions and complexity of movement as indicated. MANUAL THERAPY: (see below for minutes): Joint mobilization and Soft tissue mobilization was utilized and necessary because of the patient's restricted joint motion, painful spasm, loss of articular motion and restricted motion of soft tissue. MODALITIES: (see below for minutes): to decrease pain   SELF CARE: (see below for minutes): Procedure(s) (per grid) utilized to improve and/or restore self-care/home management as related to dressing, bathing and grooming. Required minimal verbal cueing to facilitate activities of daily living skills and compensatory activities    Date: 11/2/22 11/7/22 11/9/22    Modalities: 15 min 15 min 15 min   Gameready Gameready  15 min Gameready  15 min 15 min               Therapeutic Exercise: 32 min  32 min 40 min    Shuffle gait  3 laps  Shuffle gait  3 laps  Shuffle gait  3 laps     Standing calf raise  3x10 Sidestep band at feet  3 laps  grn Sidestep band at feet  3 laps  grn    Lateral step up   3x10  10\" box Calf raise  3x10 Calf raise  3x12    Step up march  3x10 air ex Toe walk  3 laps  Toe walk  3 laps   35#    Standing toe press  X40  blue MOBO hold  3x30\" Rockerboard mini squat  3x10     Seated calf raise  3x15  35# Lateral step up  3x10  6\" box Split  squat  3x10 B     Step up/march air ex  3x10 Tandem balance on air ex  3x30\" ea                            Proprioceptive Activities:                        Manual Therapy: 8 min      TCJ AP mobility  Gr II-III  8 min           Functional Activities:                                  Treatment/Session Summary:    Treatment Assessment: Progressing CKC activities today with significant fatigue observed however able to complete without ankle/foot pain. Communication/Consultation:  None today  Equipment provided today:  None  Recommendations/Intent for next treatment session: Next visit will focus on decrease swelling, ankle ROM and strengthening per protocol.     Total Treatment Billable Duration:  55 minutes  Time In: 1400  Time Out: 2684    Nathalia Murrieta PT       Charge Capture  }Post Session Pain  PT Visit Info  [employer] Portal  MD Guidelines  Scanned Media  Benefits  MyChart    Future Appointments   Date Time Provider Ana Sebastian   11/14/2022  2:00 PM Jung Del Toro, PT [facility]   11/16/2022  2:00 PM Jung Del Toro, PT [facility]   11/21/2022  2:00 PM Jung Del Toro PT [facility]   11/23/2022  2:00 PM Jung Del Toro PT [facility]   11/28/2022  2:00 PM Moira Bean JEFFY ChengOFLILIAN SANDOVAL   11/30/2022  2:00 PM JEFFY OtooleOFLILIAN O

## 2022-11-14 ENCOUNTER — HOSPITAL ENCOUNTER (OUTPATIENT)
Dept: PHYSICAL THERAPY | Age: 37
Setting detail: RECURRING SERIES
Discharge: HOME | End: 2022-11-17
Payer: COMMERCIAL

## 2022-11-14 PROCEDURE — 97016 VASOPNEUMATIC DEVICE THERAPY: CPT

## 2022-11-14 PROCEDURE — 97110 THERAPEUTIC EXERCISES: CPT
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
## 2022-11-14 NOTE — PROGRESS NOTES
Simi Conti  : 1985  Primary: Sascha Gutierrez Sc  Secondary:  Rubio Busby  [address] [facility] [address]  Phone: [phone]  Fax: [phone] Plan Frequency: 2x/week    Plan of Care/Certification Expiration Date: 22      PT Visit Info:  No data recorded   Visit Count:  14   OUTPATIENT PHYSICAL THERAPY:OP NOTE TYPE: Treatment Note 2022       Episode  }Appt Desk             Treatment Diagnosis:  Difficulty in walking, Not elsewhere classified (R26.2)  Other abnormalities of gait and mobility (R26.89)  Medical/Referring Diagnosis:  Left ankle instability [M25.372]  Referring Physician:  Kristy Mendiola MD MD Orders:  PT Eval and Treat   Date of Onset:  Onset Date: 22     Allergies:   Tramadol  Restrictions/Precautions:  Restrictions/Precautions: Surgical protocol; Weight Bearing  Right Lower Extremity Weight Bearing: Weight Bearing As Tolerated     Interventions Planned (Treatment may consist of any combination of the following):    Current Treatment Recommendations: ROM; Strengthening; Balance training; Functional mobility training; ADL/Self-care training; Neuromuscular re-education; Manual Therapy - Soft Tissue Mobilization; Manual Therapy - Joint Manipulation; Home exercise program; Modalities; Therapeutic activities     Subjective Comments: Reports more hip and thigh soreness compared to foot after last visit. Initial:}     -2/10Post Session:        1/10  Medications Last Reviewed:  2022  Updated Objective Findings:  PN 22   Observation  Incision C/D/I, no redness observed.       SL balance:  L 11.3 seconds  Tandem balance: R > 30 seconds, L >30 seconds     Girth measurements (R/L in cm)  Figure 8                       54/54.3     ROM (R/L in °) AROM(PROM)  Ankle DF                     20/18  Ankle PF                     50/44  Ankle eversion            40/40  Ankle inversion           15/15     Joint mobility  Talocrural: hypomobile AP     Strength (R/L)   Ankle DF                     5/5  Ankle PF                     4+/4+  Ankle eversion            4+/4+  Ankle inversion           5/4+     Palpation  No pitting edema in LE. No warmth  Outcome Measure: Tool Used: FOOT AND ANKLE ABILITY MEASURE  Score:  Initial: 30 Most Recent: 62 (Date: 10/25/22 )     Goals: (Goals have been discussed and agreed upon with patient.)  Short-Term Functional Goals: Time Frame: 2 weeks  Patient will be independent with HEP. (Complete)  Patient will decrease pain to < 5/10. (Complete)  Patient will decrease swelling to be symmetrical to contralateral side. (complete)  Discharge Goals: Time Frame: 8 weeks  Patient will decrease ankle pain to < 2/10 in order to complete full work day. (complete)  Patient will increase DF ROM to at least 10 in order to walk without compensations. (complete)  Patient will increase PF strength to 5/5 to improve ability to rise on toes for reaching OH. (Ongoing)  Treatment   TREATMENT:   THERAPEUTIC ACTIVITY: ( see below for minutes): Therapeutic activities per grid below to improve mobility, strength, balance and coordination. Required minimal visual, verbal, manual and tactile cues to improve independence and safety with daily activities . THERAPEUTIC EXERCISE: (see below for minutes): Exercises per grid below to improve mobility, strength, balance and coordination. Required minimal verbal and manual cues to promote proper body alignment, promote proper body posture and promote proper body mechanics. Progressed resistance, range, repetitions and complexity of movement as indicated. MANUAL THERAPY: (see below for minutes): Joint mobilization and Soft tissue mobilization was utilized and necessary because of the patient's restricted joint motion, painful spasm, loss of articular motion and restricted motion of soft tissue.    MODALITIES: (see below for minutes): to decrease pain   SELF CARE: (see below for minutes): Procedure(s) (per grid) utilized to improve and/or restore self-care/home management as related to dressing, bathing and grooming. Required minimal verbal cueing to facilitate activities of daily living skills and compensatory activities    Date: 11/7/22 11/9/22 11/14/22     Modalities: 15 min 15 min 15 min    Gameready Gameready  15 min 15 min 15 min                  Therapeutic Exercise: 32 min 40 min 40 min     Shuffle gait  3 laps  Shuffle gait  3 laps  Shuffle gait  3 laps      Sidestep band at feet  3 laps  grn Sidestep band at feet  3 laps  grn Sidestep band at feet  3 laps  grn     Calf raise  3x10 Calf raise  3x12 Toe walk  3 laps  20#     Toe walk  3 laps  Toe walk  3 laps   35# MOBO 2/4  3x30\"     MOBO hold  3x30\" Rockerboard mini squat  3x10  Foot on 4\" box staggered stance  3x10     Lateral step up  3x10  6\" box Split  squat  3x10 B Marching on air ex  X30 B     Step up/march air ex  3x10 Tandem balance on air ex  3x30\" ea                                  Proprioceptive Activities:                            Manual Therapy:                     Functional Activities:                                       Treatment/Session Summary:    Treatment Assessment: See re-certification 11/03/67. Communication/Consultation:  None today  Equipment provided today:  None  Recommendations/Intent for next treatment session: Next visit will focus on decrease swelling, ankle ROM and strengthening per protocol.     Total Treatment Billable Duration:  55 minutes  Time In: 1405  Time Out: JEFFY Cabral       Charge Capture  }Post Session Pain  PT Visit Info  [employer] Portal  MD Guidelines  Scanned Media  Benefits  MyChart    Future Appointments   Date Time Provider Ana Sebastian   11/16/2022  2:00 PM Elena Lozano PT [facility]   11/21/2022  2:00 PM Elena Lzoano PT [facility]   11/23/2022  2:00 PM Elena Lozano PT [facility]   11/28/2022  2:00 PM Elena Lozano PT [facility]   11/30/2022 [facility]

## 2022-11-16 ENCOUNTER — HOSPITAL ENCOUNTER (OUTPATIENT)
Dept: PHYSICAL THERAPY | Age: 37
Setting detail: RECURRING SERIES
Discharge: HOME | End: 2022-11-19
Payer: COMMERCIAL

## 2022-11-16 PROCEDURE — 97110 THERAPEUTIC EXERCISES: CPT

## 2022-11-16 PROCEDURE — 97016 VASOPNEUMATIC DEVICE THERAPY: CPT

## 2022-11-16 NOTE — PROGRESS NOTES
Israel Conti  : 1985  Primary: Pipe Karel Martinez  Secondary:  Nino Castro  [address] [facility] [address]  Phone: [phone]  Fax: [phone] Plan Frequency: 2x/week    Plan of Care/Certification Expiration Date: 22      PT Visit Info:  No data recorded   Visit Count:  15   OUTPATIENT PHYSICAL THERAPY:OP NOTE TYPE: Treatment Note 2022       Episode  }Appt Desk             Treatment Diagnosis:  Difficulty in walking, Not elsewhere classified (R26.2)  Other abnormalities of gait and mobility (R26.89)  Medical/Referring Diagnosis:  Left ankle instability [M25.372]  Referring Physician:  Suze Combs MD MD Orders:  PT Eval and Treat   Date of Onset:  Onset Date: 22     Allergies:   Tramadol  Restrictions/Precautions:  Restrictions/Precautions: Surgical protocol; Weight Bearing  Right Lower Extremity Weight Bearing: Weight Bearing As Tolerated     Interventions Planned (Treatment may consist of any combination of the following):    Current Treatment Recommendations: ROM; Strengthening; Balance training; Functional mobility training; ADL/Self-care training; Neuromuscular re-education; Manual Therapy - Soft Tissue Mobilization; Manual Therapy - Joint Manipulation; Home exercise program; Modalities; Therapeutic activities     Subjective Comments: Felt better after last visit compared to the previous one. Initial:}     -2/10Post Session:        1/10  Medications Last Reviewed:  2022  Updated Objective Findings:  PN 22   Observation  Incision C/D/I, no redness observed.       SL balance:  L 11.3 seconds  Tandem balance: R > 30 seconds, L >30 seconds     Girth measurements (R/L in cm)  Figure 8                       54/54.3     ROM (R/L in °) AROM(PROM)  Ankle DF                     20/18  Ankle PF                     50/44  Ankle eversion            40/40  Ankle inversion           15/15     Joint mobility  Talocrural: hypomobile AP     Strength (R/L)   Ankle DF                     5/5  Ankle PF                     4+/4+  Ankle eversion            4+/4+  Ankle inversion           5/4+     Palpation  No pitting edema in LE. No warmth  Outcome Measure: Tool Used: FOOT AND ANKLE ABILITY MEASURE  Score:  Initial: 30 Most Recent: 62 (Date: 10/25/22 )     Goals: (Goals have been discussed and agreed upon with patient.)  Short-Term Functional Goals: Time Frame: 2 weeks  Patient will be independent with HEP. (Complete)  Patient will decrease pain to < 5/10. (Complete)  Patient will decrease swelling to be symmetrical to contralateral side. (complete)  Discharge Goals: Time Frame: 8 weeks  Patient will decrease ankle pain to < 2/10 in order to complete full work day. (complete)  Patient will increase DF ROM to at least 10 in order to walk without compensations. (complete)  Patient will increase PF strength to 5/5 to improve ability to rise on toes for reaching OH. (Ongoing)  Treatment   TREATMENT:   THERAPEUTIC ACTIVITY: ( see below for minutes): Therapeutic activities per grid below to improve mobility, strength, balance and coordination. Required minimal visual, verbal, manual and tactile cues to improve independence and safety with daily activities . THERAPEUTIC EXERCISE: (see below for minutes): Exercises per grid below to improve mobility, strength, balance and coordination. Required minimal verbal and manual cues to promote proper body alignment, promote proper body posture and promote proper body mechanics. Progressed resistance, range, repetitions and complexity of movement as indicated. MANUAL THERAPY: (see below for minutes): Joint mobilization and Soft tissue mobilization was utilized and necessary because of the patient's restricted joint motion, painful spasm, loss of articular motion and restricted motion of soft tissue.    MODALITIES: (see below for minutes): to decrease pain   SELF CARE: (see below for minutes): Procedure(s) (per grid) utilized to improve and/or restore self-care/home management as related to dressing, bathing and grooming. Required minimal verbal cueing to facilitate activities of daily living skills and compensatory activities    Date: 11/7/22 11/9/22 11/14/22 11/16/22    Modalities: 15 min 15 min 15 min 15 min   Gameready Gameready  15 min 15 min 15 min 15 min                 Therapeutic Exercise: 32 min 40 min 40 min 45 min    Shuffle gait  3 laps  Shuffle gait  3 laps  Shuffle gait  3 laps  Shuffle gait  3 laps     Sidestep band at feet  3 laps  grn Sidestep band at feet  3 laps  grn Sidestep band at feet  3 laps  grn Sidestep band at feet  3 laps  grn    Calf raise  3x10 Calf raise  3x12 Toe walk  3 laps  20# Calf raise w/ heel squeeze  4x10     Toe walk  3 laps  Toe walk  3 laps   35# MOBO 2/4  3x30\" Fwd lunge  3x10 B    MOBO hold  3x30\" Rockerboard mini squat  3x10  Foot on 4\" box staggered stance  3x10 MOBO 2/4  3x30\"    Lateral step up  3x10  6\" box Split  squat  3x10 B Marching on air ex  X30 B Calf raise  3x10  20#    Step up/march air ex  3x10 Tandem balance on air ex  3x30\" ea                                  Proprioceptive Activities:                            Manual Therapy:                     Functional Activities:                                       Treatment/Session Summary:    Treatment Assessment: Progressing foot intrinsic strengthening today with lunging forward to emphasize push off mechanics. Continues to fatigue with continued therex. Communication/Consultation:  None today  Equipment provided today:  None  Recommendations/Intent for next treatment session: Next visit will focus on decrease swelling, ankle ROM and strengthening per protocol.     Total Treatment Billable Duration:  60 minutes  Time In: 1400  Time Out: 1500    SAL CATHERINE, PT       Charge Capture  }Post Session Pain  PT Visit Info  [facility]  MD Guidelines  Scanned Media  Benefits  MyChart    Future Appointments Date Time Provider Ana Sebastian   11/21/2022  2:00 PM Elena Marta, PT [facility]   11/23/2022  2:00 PM Elena Marta, PT [facility]   11/28/2022  2:00 PM Elena Marta, PT [facility]   11/30/2022  2:00 PM Elena Marta, PT [facility]

## 2022-11-21 ENCOUNTER — HOSPITAL ENCOUNTER (OUTPATIENT)
Dept: PHYSICAL THERAPY | Age: 37
Setting detail: RECURRING SERIES
End: 2022-11-21
Payer: COMMERCIAL

## 2022-11-23 ENCOUNTER — HOSPITAL ENCOUNTER (OUTPATIENT)
Dept: PHYSICAL THERAPY | Age: 37
Setting detail: RECURRING SERIES
Discharge: HOME | End: 2022-11-26
Payer: COMMERCIAL

## 2022-11-23 PROCEDURE — 97110 THERAPEUTIC EXERCISES: CPT

## 2022-11-23 PROCEDURE — 97016 VASOPNEUMATIC DEVICE THERAPY: CPT

## 2022-11-23 NOTE — PROGRESS NOTES
Elliott Maria E Conti  : 1985  Primary: Jesus Martinez  Secondary:  Fransisco Alvarenga  [address] [facility] [address]  Phone: [phone]  Fax: [phone] Plan Frequency: 2x/week    Plan of Care/Certification Expiration Date: 22      PT Visit Info:  No data recorded   Visit Count:  16   OUTPATIENT PHYSICAL OGHVJYS:IC NOTE TYPE: Treatment Note 2022       Episode  }Appt Desk             Treatment Diagnosis:  Difficulty in walking, Not elsewhere classified (R26.2)  Other abnormalities of gait and mobility (R26.89)  Medical/Referring Diagnosis:  Left ankle instability [M25.372]  Referring Physician:  Kale Perez MD MD Orders:  PT Eval and Treat   Date of Onset:  Onset Date: 22     Allergies:   Tramadol  Restrictions/Precautions:  Restrictions/Precautions: Surgical protocol; Weight Bearing  Right Lower Extremity Weight Bearing: Weight Bearing As Tolerated     Interventions Planned (Treatment may consist of any combination of the following):    Current Treatment Recommendations: ROM; Strengthening; Balance training; Functional mobility training; ADL/Self-care training; Neuromuscular re-education; Manual Therapy - Soft Tissue Mobilization; Manual Therapy - Joint Manipulation; Home exercise program; Modalities; Therapeutic activities     Subjective Comments: Reports foot has been feeling ok. Has been stressed due to gas leak at home and also identity theft situation that he has been dealing with. Initial:}     -2/10Post Session:        1/10  Medications Last Reviewed:  2022  Updated Objective Findings:  PN 22   Observation  Incision C/D/I, no redness observed.       SL balance:  L 11.3 seconds  Tandem balance: R > 30 seconds, L >30 seconds     Girth measurements (R/L in cm)  Figure 8                       54/54.3     ROM (R/L in °) AROM(PROM)  Ankle DF                     20/18  Ankle PF                     50/44  Ankle eversion 40/40  Ankle inversion           15/15     Joint mobility  Talocrural: hypomobile AP     Strength (R/L)   Ankle DF                     5/5  Ankle PF                     4+/4+  Ankle eversion            4+/4+  Ankle inversion           5/4+     Palpation  No pitting edema in LE. No warmth  Outcome Measure: Tool Used: FOOT AND ANKLE ABILITY MEASURE  Score:  Initial: 30 Most Recent: 62 (Date: 10/25/22 )     Goals: (Goals have been discussed and agreed upon with patient.)  Short-Term Functional Goals: Time Frame: 2 weeks  Patient will be independent with HEP. (Complete)  Patient will decrease pain to < 5/10. (Complete)  Patient will decrease swelling to be symmetrical to contralateral side. (complete)  Discharge Goals: Time Frame: 8 weeks  Patient will decrease ankle pain to < 2/10 in order to complete full work day. (complete)  Patient will increase DF ROM to at least 10 in order to walk without compensations. (complete)  Patient will increase PF strength to 5/5 to improve ability to rise on toes for reaching OH. (Ongoing)  Treatment   TREATMENT:   THERAPEUTIC ACTIVITY: ( see below for minutes): Therapeutic activities per grid below to improve mobility, strength, balance and coordination. Required minimal visual, verbal, manual and tactile cues to improve independence and safety with daily activities . THERAPEUTIC EXERCISE: (see below for minutes): Exercises per grid below to improve mobility, strength, balance and coordination. Required minimal verbal and manual cues to promote proper body alignment, promote proper body posture and promote proper body mechanics. Progressed resistance, range, repetitions and complexity of movement as indicated. MANUAL THERAPY: (see below for minutes): Joint mobilization and Soft tissue mobilization was utilized and necessary because of the patient's restricted joint motion, painful spasm, loss of articular motion and restricted motion of soft tissue.    MODALITIES: (see below for minutes): to decrease pain   SELF CARE: (see below for minutes): Procedure(s) (per grid) utilized to improve and/or restore self-care/home management as related to dressing, bathing and grooming. Required minimal verbal cueing to facilitate activities of daily living skills and compensatory activities    Date: 11/14/22 11/16/22 11/23/22    Modalities: 15 min 15 min 15 min   Gameready 15 min 15 min 15 min               Therapeutic Exercise: 40 min 45 min 45 min     Shuffle gait  3 laps  Shuffle gait  3 laps  Bike  5 min    Sidestep band at feet  3 laps  grn Sidestep band at feet  3 laps  grn Shuffle gait  3 laps    Toe walk  3 laps  20# Calf raise w/ heel squeeze  4x10  Sidestep band at feet  3 laps  grn    MOBO 2/4  3x30\" Fwd lunge  3x10 B Walking lunges  3 laps    Foot on 4\" box staggered stance  3x10 MOBO 2/4  3x30\" Standing dynadisc  3x10 CW/CCW    Marching on air ex  X30 B Calf raise  3x10  20# MOBO 2/4  3x30\"      Calf raise  4x10  20#      Step up march  x30  Air ex      Slantboard stretch  30x10\"                Proprioceptive Activities:                        Manual Therapy:                  Functional Activities:                                  Treatment/Session Summary:    Treatment Assessment: Patient demonstrating improved confidence with lunging movements today. Plan is to introduce light plyometrics next visit. Communication/Consultation:  None today  Equipment provided today:  None  Recommendations/Intent for next treatment session: Next visit will focus on strengthening and introduce plyometrics.      Total Treatment Billable Duration:  60 minutes  Time In: 4309  Time Out: 1330    SAL CATHERINE, PT       Charge Capture  }Post Session Pain  PT Visit Info  [facility]  MD Guidelines  Scanned Media  Benefits  MyChart    Future Appointments   Date Time Provider Ana Sebastian   11/28/2022  2:00 PM Nati Slater, PT [facility]   11/30/2022  2:00 [facility] 12/6/2022  2:00 PM Mendoza Ivory, PT [facility]   12/8/2022  2:45 PM Mendoza Ivory, PT [facility]   12/13/2022  2:00 PM Mendoza Ivory, PT [facility]   12/15/2022  2:45 PM Mendoza Ivory, PT [facility]   12/20/2022  2:00 PM Mendoza Ivory, PT [facility]   12/22/2022  2:00 PM Mendoza Ivory, PT [facility]   12/27/2022  2:00 PM Mendoza Ivory, PT [facility]   12/29/2022  2:00 PM [facility]

## 2022-11-28 ENCOUNTER — HOSPITAL ENCOUNTER (OUTPATIENT)
Dept: PHYSICAL THERAPY | Age: 37
Setting detail: RECURRING SERIES
Discharge: HOME | End: 2022-12-01
Payer: COMMERCIAL

## 2022-11-28 PROCEDURE — 97016 VASOPNEUMATIC DEVICE THERAPY: CPT

## 2022-11-28 PROCEDURE — 97110 THERAPEUTIC EXERCISES: CPT

## 2022-11-28 NOTE — PROGRESS NOTES
Gladis Conti  : 1985  Primary: Ayad Schmid Sc  Secondary:  Paul Ambrose  [address] [facility] [address]  Phone: [phone]  Fax: [phone] Plan Frequency: 2x/week    Plan of Care/Certification Expiration Date: 22      PT Visit Info:  No data recorded   Visit Count:  17   OUTPATIENT PHYSICAL HDDDHEK:OG NOTE TYPE: Treatment Note 2022       Episode  }Appt Desk             Treatment Diagnosis:  Difficulty in walking, Not elsewhere classified (R26.2)  Other abnormalities of gait and mobility (R26.89)  Medical/Referring Diagnosis:  Left ankle instability [M25.372]  Referring Physician:  Teetee Jiang MD MD Orders:  PT Eval and Treat   Date of Onset:  Onset Date: 22     Allergies:   Tramadol  Restrictions/Precautions:  Restrictions/Precautions: Surgical protocol; Weight Bearing  Right Lower Extremity Weight Bearing: Weight Bearing As Tolerated     Interventions Planned (Treatment may consist of any combination of the following):    Current Treatment Recommendations: ROM; Strengthening; Balance training; Functional mobility training; ADL/Self-care training; Neuromuscular re-education; Manual Therapy - Soft Tissue Mobilization; Manual Therapy - Joint Manipulation; Home exercise program; Modalities; Therapeutic activities     Subjective Comments: Foot has been without pain; was able to ride bike to PT appointment today without difficulty outside of conditioning. Initial:}     0/10Post Session:        0/10  Medications Last Reviewed:  2022  Updated Objective Findings:  PN 22   Observation  Incision C/D/I, no redness observed.       SL balance:  L 11.3 seconds  Tandem balance: R > 30 seconds, L >30 seconds     Girth measurements (R/L in cm)  Figure 8                       54/54.3     ROM (R/L in °) AROM(PROM)  Ankle DF                     20/18  Ankle PF                     50/44  Ankle eversion            40/40  Ankle inversion 15/15     Joint mobility  Talocrural: hypomobile AP     Strength (R/L)   Ankle DF                     5/5  Ankle PF                     4+/4+  Ankle eversion            4+/4+  Ankle inversion           5/4+     Palpation  No pitting edema in LE. No warmth  Outcome Measure: Tool Used: FOOT AND ANKLE ABILITY MEASURE  Score:  Initial: 30 Most Recent: 62 (Date: 10/25/22 )     Goals: (Goals have been discussed and agreed upon with patient.)  Short-Term Functional Goals: Time Frame: 2 weeks  Patient will be independent with HEP. (Complete)  Patient will decrease pain to < 5/10. (Complete)  Patient will decrease swelling to be symmetrical to contralateral side. (complete)  Discharge Goals: Time Frame: 8 weeks  Patient will decrease ankle pain to < 2/10 in order to complete full work day. (complete)  Patient will increase DF ROM to at least 10 in order to walk without compensations. (complete)  Patient will increase PF strength to 5/5 to improve ability to rise on toes for reaching OH. (Ongoing)  Treatment   TREATMENT:   THERAPEUTIC ACTIVITY: ( see below for minutes): Therapeutic activities per grid below to improve mobility, strength, balance and coordination. Required minimal visual, verbal, manual and tactile cues to improve independence and safety with daily activities . THERAPEUTIC EXERCISE: (see below for minutes): Exercises per grid below to improve mobility, strength, balance and coordination. Required minimal verbal and manual cues to promote proper body alignment, promote proper body posture and promote proper body mechanics. Progressed resistance, range, repetitions and complexity of movement as indicated. MANUAL THERAPY: (see below for minutes): Joint mobilization and Soft tissue mobilization was utilized and necessary because of the patient's restricted joint motion, painful spasm, loss of articular motion and restricted motion of soft tissue.    MODALITIES: (see below for minutes): to decrease pain SELF CARE: (see below for minutes): Procedure(s) (per grid) utilized to improve and/or restore self-care/home management as related to dressing, bathing and grooming. Required minimal verbal cueing to facilitate activities of daily living skills and compensatory activities    Date: 11/14/22 11/16/22 11/23/22 11/28/22    Modalities: 15 min 15 min 15 min 15 min   Gameready 15 min 15 min 15 min 15 min                 Therapeutic Exercise: 40 min 45 min 45 min  38 min    Shuffle gait  3 laps  Shuffle gait  3 laps  Bike  5 min Shuffle gait  3 laps    Sidestep band at feet  3 laps  grn Sidestep band at feet  3 laps  grn Shuffle gait  3 laps Sidestep band at feet  3 laps    Toe walk  3 laps  20# Calf raise w/ heel squeeze  4x10  Sidestep band at feet  3 laps  grn Walking lunge into calf raise  3 laps     MOBO 2/4  3x30\" Fwd lunge  3x10 B Walking lunges  3 laps Calf raise  3x10 DL  3x10 SL B    Foot on 4\" box staggered stance  3x10 MOBO 2/4  3x30\" Standing dynadisc  3x10 CW/CCW MOBO 2/4   3x30\"     Marching on air ex  X30 B Calf raise  3x10  20# MOBO 2/4  3x30\" DL hops  3x15      Calf raise  4x10  20# Line jump  3x10 fwd/backward       Step up march  x30  Air ex       Slantboard stretch  30x10\"                   Proprioceptive Activities:                            Manual Therapy:                     Functional Activities:                                       Treatment/Session Summary:    Treatment Assessment: Introducing light plyometrics today, no difficulty with DL hops in place; increased difficulty with fwd/backward line without ankle pain. Communication/Consultation:  None today  Equipment provided today:  None  Recommendations/Intent for next treatment session: Next visit will focus on strengthening and introduce plyometrics.      Total Treatment Billable Duration:  53 minutes  Time In: 1407  Time Out: 1500    SAL CATHERINE, PT       Charge Capture  }Post Session Pain  PT Visit [address] [facility]  MD Guidelines  Scanned Media  Benefits  MyChart    Future Appointments   Date Time Provider Ana Sebastian   11/30/2022  2:00 PM Berto Gould, PT [facility]   12/6/2022  2:00 PM Berto Gould, PT [facility]   12/8/2022  2:45 PM Berto Gould, PT [facility]   12/13/2022  2:00 PM Berto Gould, PT [facility]   12/15/2022  2:45 PM Berto Gould, PT [facility]   12/20/2022  2:00 PM Berto Gould, PT [facility]   12/22/2022  2:00 PM Berto Gould, PT [facility]   12/27/2022  2:00 PM Berto Gould, PT [facility]   12/29/2022  2:00 PM Berto Gould, PT [facility]

## 2022-11-30 ENCOUNTER — HOSPITAL ENCOUNTER (OUTPATIENT)
Dept: PHYSICAL THERAPY | Age: 37
Setting detail: RECURRING SERIES
Discharge: HOME | End: 2022-12-03
Payer: COMMERCIAL

## 2022-11-30 PROCEDURE — 97110 THERAPEUTIC EXERCISES: CPT

## 2022-11-30 PROCEDURE — 97016 VASOPNEUMATIC DEVICE THERAPY: CPT

## 2022-11-30 NOTE — PROGRESS NOTES
Israel Conti  : 1985  Primary: Pipe Vinson Sc  Secondary:  Nino Castro  [address] [facility] [address]  Phone: [phone]  Fax: [phone] Plan Frequency: 2x/week    Plan of Care/Certification Expiration Date: 22      PT Visit Info:  No data recorded   Visit Count:  18   OUTPATIENT PHYSICAL THERAPY:OP NOTE TYPE: Treatment Note 2022       Episode  }Appt Desk             Treatment Diagnosis:  Difficulty in walking, Not elsewhere classified (R26.2)  Other abnormalities of gait and mobility (R26.89)  Medical/Referring Diagnosis:  Left ankle instability [M25.372]  Referring Physician:  Suze Combs MD MD Orders:  PT Eval and Treat   Date of Onset:  Onset Date: 22     Allergies:   Tramadol  Restrictions/Precautions:  Restrictions/Precautions: Surgical protocol; Weight Bearing  Right Lower Extremity Weight Bearing: Weight Bearing As Tolerated     Interventions Planned (Treatment may consist of any combination of the following):    Current Treatment Recommendations: ROM; Strengthening; Balance training; Functional mobility training; ADL/Self-care training; Neuromuscular re-education; Manual Therapy - Soft Tissue Mobilization; Manual Therapy - Joint Manipulation; Home exercise program; Modalities; Therapeutic activities     Subjective Comments: Min muscle soreness today after hopping activities last session. Initial:}     0/10Post Session:        0/10  Medications Last Reviewed:  2022  Updated Objective Findings:  PN 22   Observation  Incision C/D/I, no redness observed.       SL balance:  L 11.3 seconds  Tandem balance: R > 30 seconds, L >30 seconds     Girth measurements (R/L in cm)  Figure 8                       54/54.3     ROM (R/L in °) AROM(PROM)  Ankle DF                     20/18  Ankle PF                     50/44  Ankle eversion            40/40  Ankle inversion           15/15     Joint mobility  Talocrural: hypomobile AP     Strength (R/L)   Ankle DF                     5/5  Ankle PF                     4+/4+  Ankle eversion            4+/4+  Ankle inversion           5/4+     Palpation  No pitting edema in LE. No warmth  Outcome Measure: Tool Used: FOOT AND ANKLE ABILITY MEASURE  Score:  Initial: 30 Most Recent: 62 (Date: 10/25/22 )     Goals: (Goals have been discussed and agreed upon with patient.)  Short-Term Functional Goals: Time Frame: 2 weeks  Patient will be independent with HEP. (Complete)  Patient will decrease pain to < 5/10. (Complete)  Patient will decrease swelling to be symmetrical to contralateral side. (complete)  Discharge Goals: Time Frame: 8 weeks  Patient will decrease ankle pain to < 2/10 in order to complete full work day. (complete)  Patient will increase DF ROM to at least 10 in order to walk without compensations. (complete)  Patient will increase PF strength to 5/5 to improve ability to rise on toes for reaching OH. (Ongoing)  Treatment   TREATMENT:   THERAPEUTIC ACTIVITY: ( see below for minutes): Therapeutic activities per grid below to improve mobility, strength, balance and coordination. Required minimal visual, verbal, manual and tactile cues to improve independence and safety with daily activities . THERAPEUTIC EXERCISE: (see below for minutes): Exercises per grid below to improve mobility, strength, balance and coordination. Required minimal verbal and manual cues to promote proper body alignment, promote proper body posture and promote proper body mechanics. Progressed resistance, range, repetitions and complexity of movement as indicated. MANUAL THERAPY: (see below for minutes): Joint mobilization and Soft tissue mobilization was utilized and necessary because of the patient's restricted joint motion, painful spasm, loss of articular motion and restricted motion of soft tissue.    MODALITIES: (see below for minutes): to decrease pain   SELF CARE: (see below for minutes): Procedure(s) (per grid) utilized to improve and/or restore self-care/home management as related to dressing, bathing and grooming. Required minimal verbal cueing to facilitate activities of daily living skills and compensatory activities    Date: 11/16/22 11/23/22 11/28/22 11/30/22    Modalities: 15 min 15 min 15 min 15 min   Gameready 15 min 15 min 15 min 15 min                 Therapeutic Exercise: 45 min 45 min  38 min 45 min     Shuffle gait  3 laps  Bike  5 min Shuffle gait  3 laps Slantboard   2x1'    Sidestep band at feet  3 laps  grn Shuffle gait  3 laps Sidestep band at feet  3 laps Shuffle gait  3 laps     Calf raise w/ heel squeeze  4x10  Sidestep band at feet  3 laps  grn Walking lunge into calf raise  3 laps  Sidestep band at feet  3 laps    Fwd lunge  3x10 B Walking lunges  3 laps Calf raise  3x10 DL  3x10 SL B Calf raise  3x10 DL 20#  3x10 SL B     MOBO 2/4  3x30\" Standing dynadisc  3x10 CW/CCW MOBO 2/4   3x30\"  Walking lunge into calf raise  3 laps     Calf raise  3x10  20# MOBO 2/4  3x30\" DL hops  3x15 MOBO 2/4   X4 to failure      Calf raise  4x10  20# Line jump  3x10 fwd/backward  DL hops  3x15     Step up march  x30  Air ex  Line jump  3x10 fwd/backward   2x10 lateral     Slantboard stretch  30x10\"                    Proprioceptive Activities:                            Manual Therapy:                     Functional Activities:                                       Treatment/Session Summary:    Treatment Assessment: Adding light DL lateral movement plyometrics today; appropriate landing and push-off mechanics. Communication/Consultation:  None today  Equipment provided today:  None  Recommendations/Intent for next treatment session: Next visit will focus on strengthening and introduce plyometrics.      Total Treatment Billable Duration:  53 minutes  Time In: 1400  Time Out: 1500    SAL CATHERINE, PT       Charge Capture  }Post Session Pain  PT Visit Info  FIRE[facility] Portal  MD Guidelines  Scanned Media Benefits  MyChart    Future Appointments   Date Time Provider Ana Jaz   12/6/2022  2:00 PM Presley Bravo, PT [facility]   12/8/2022  2:45 PM Presley Lawrence, PT [facility]   12/13/2022  2:00 PM Presley Lawrence, PT [facility]   12/15/2022  2:45 PM Presley Bravo, PT [facility]   12/20/2022  2:00 PM Presley Lawrence, PT [facility]   12/22/2022  2:00 PM Presley Lawrence, PT [facility]   12/27/2022  2:00 PM Presley Lawrence, PT [facility]   12/29/2022  2:00 PM [facility]

## 2022-12-28 NOTE — PLAN OF CARE
vascular surgery; hernia repair; and Ankle surgery (Left, 6/9/2022). Social History/Living Environment:   Lives With: Family  Type of Home: House  Home Layout: Multi-level     Prior Level of Function/Work/Activity:   Prior level of function: Independent  Type of Occupation: Works at [address] recorded   Learning:   Does the patient/guardian have any barriers to learning?: No barriers  Will there be a co-learner?: No  What is the preferred language of the patient/guardian?: English  Is an  required?: No  How does the patient/guardian prefer to learn new concepts?: Listening; Pictures/Videos; Demonstration     Fall Risk Scale: Ramsay Total Score: 20  Ramsay Fall Risk: Low (0-24)     Dominant Side:  right handed      OBJECTIVE   Observation  Incision C/D/I, no redness observed. SL balance:  L 16.3 seconds  Tandem balance: R > 30 seconds, L 20 seconds     Girth measurements (R/L in cm)  Figure 8                       54/54.3     ROM (R/L in °) AROM(PROM)  Ankle DF                     20/18  Ankle PF                     50/44  Ankle eversion            40/40  Ankle inversion           15/15    Joint mobility  Talocrural: hypomobile AP     Strength (R/L)   Ankle DF                     5/5  Ankle PF                     4+/4+  Ankle eversion            4+/4+  Ankle inversion           5/4+     Palpation  No pitting edema in LE. No warmth  ASSESSMENT   Initial Assessment:  Patient presents to physical therapy s/p L lateral ankle ligament reconstruction, DOS 6/9/22 . They present today with increased swelling, increased pain, decreased ROM, and decreased strength. These impairments are causing difficulty with the following tasks: walking, stairs, squatting, all household ADLs. Patient will benefit from skilled physical therapy to address their above impairments and functional limitations to assist with returning to [facility].      Problem List: (Impacting functional limitations): Body Structures, Functions, Activity Limitations Requiring Skilled Therapeutic Intervention: Decreased functional mobility ; Decreased ADL status; Decreased ROM; Decreased body mechanics; Decreased strength; Decreased balance; Increased pain     Therapy Prognosis:   Therapy Prognosis: Good     Assessment Complexity:      PLAN   Effective Dates: 9/13/22  TO Plan of Care/Certification Expiration Date: 12/14/22     Frequency/Duration: Plan Frequency: 2x/week     Interventions Planned (Treatment may consist of any combination of the following):    Current Treatment Recommendations: ROM; Strengthening; Balance training; Functional mobility training; ADL/Self-care training; Neuromuscular re-education; Manual Therapy - Soft Tissue Mobilization; Manual Therapy - Joint Manipulation; Home exercise program; Modalities; Therapeutic activities     Goals: (Goals have been discussed and agreed upon with patient.)  Short-Term Functional Goals: Time Frame: 2 weeks  Patient will be independent with HEP. (Complete)  Patient will decrease pain to < 5/10. (Complete)  Patient will decrease swelling to be symmetrical to contralateral side. (Complete)  Discharge Goals: Time Frame: 8 weeks  Patient will decrease ankle pain to < 2/10 in order to complete full work day. (Complete)  Patient will increase DF ROM to at least 10 in order to walk without compensations. (Complete)  Patient will increase PF strength to 5/5 to improve ability to rise on toes for reaching OH. (Not met)         Outcome Measure: Tool Used: FOOT AND ANKLE ABILITY MEASURE  Score:  Initial: 30 Most Recent: 49 (Date: 11/14/22 )   Interpretation of Score: For the \"Activities of Daily Living\", there are 21 questions each scored on a 5 point scale with 0 representing \"Unable to do\" and 4 representing \"No difficulty\". The lower the score, the greater the functional disability. 84/84 represents no disability. Minimal detectable change is 5.7 points.   With the addition of the 8 questions in the \"Sports Subscale,\" there are 29 questions, each scored on a 5 point scale with 0 representing \"Unable to do\" and 4 representing \"No difficulty\". The lower the score, the greater the functional disability. 116/116 represents no disability. Minimal detectable change is 12.3 points. Medical Necessity:   > Patient demonstrates good rehab potential due to higher previous functional level. Reason For Services/Other Comments:  Patient has not returned to PT since 11/14/22 appointment, has not been able to reach to reschedule. Patient is discharged from PT at this time. Total Duration:  Time In: 1400  Time Out: 1500    Regarding Topher Conti's therapy, I certify that the treatment plan above will be carried out by a therapist or under their direction. Thank you for this referral,  Yanni Meade, PT     Referring Physician Signature: Brian Mcdonald MD No Signature is Required for this note.         Post Session Pain  Charge Capture  PT Visit Info MD Guidelines  Bee

## 2025-03-03 ENCOUNTER — TELEPHONE (OUTPATIENT)
Dept: INTERNAL MEDICINE CLINIC | Facility: CLINIC | Age: 40
End: 2025-03-03

## 2025-03-03 NOTE — TELEPHONE ENCOUNTER
----- Message from Luz Maria BERRY sent at 3/3/2025  4:02 PM EST -----  Regarding: [facility] Appointment Request  [facility] Appointment Request    Patient needs appointment for [facility] Appointment Type: New Patient.    Patient Requested Dates(s): As soon as possible  Patient Requested Time: As soon as possible  Provider Name: Jumana Ferreira MD    Reason for Appointment Request: New Patient - Available appointments did not meet patient need. Patient has booked a new patient appointment with Dr. Ferreira on 6/13/2025 at 3:00 pm, but he wants to be informed in case there is an earlier availability.  --------------------------------------------------------------------------------------------------------------------------    Relationship to Patient: Self     Call Back Information: OK to leave message on voicemail  Preferred Call Back Number: Phone [phone]

## 2025-05-20 ENCOUNTER — TELEPHONE (OUTPATIENT)
Dept: INTERNAL MEDICINE CLINIC | Facility: CLINIC | Age: 40
End: 2025-05-20

## 2025-06-13 ENCOUNTER — TELEPHONE (OUTPATIENT)
Dept: INTERNAL MEDICINE CLINIC | Facility: CLINIC | Age: 40
End: 2025-06-13

## (undated) DEVICE — HEALICOIL REGENESORB 4.75 MM                                    THREADED DILATOR, DISPOSABLE: Brand: HEALICOIL

## (undated) DEVICE — SUTURE VCRL SZ 2-0 L27IN ABSRB UD L26MM CT-2 1/2 CIR J269H

## (undated) DEVICE — GLOVE SURG SZ 8 L12IN FNGR THK79MIL GRN LTX FREE

## (undated) DEVICE — SOLUTION IRRIG 1000ML 09% SOD CHL USP PIC PLAS CONTAINER

## (undated) DEVICE — DISPOSABLE KIT FOR 1.8 MM Q-FIX                                    IMPLANT, INCLUDES DRILL, DRILL GUIDE                                    AND OBTURATOR: Brand: Q-FIX

## (undated) DEVICE — ZIMMER® STERILE DISPOSABLE TOURNIQUET CUFF WITH PLC, DUAL PORT, SINGLE BLADDER, 18 IN. (46 CM)

## (undated) DEVICE — INTENT TO BE USED WITH SUTURE MATERIAL FOR TISSUE CLOSURE: Brand: RICHARD-ALLAN®  NEEDLE 1/2 CIRCLE REVERSE CUTTING

## (undated) DEVICE — GLOVE SURG SZ 65 L12IN FNGR THK79MIL GRN LTX FREE

## (undated) DEVICE — GOWN,SIRUS,NONRNF,SETINSLV,XL,20/CS: Brand: MEDLINE

## (undated) DEVICE — DRILL 4MM FOR 4.5MM ANCHOR SL-PLUS

## (undated) DEVICE — GLOVE SURG SZ 65 CRM LTX FREE POLYISOPRENE POLYMER BEAD ANTI

## (undated) DEVICE — SPLINT CAST W4XL30IN WHT THMB FBRGLS PRECUT INTLOK WRINKLE

## (undated) DEVICE — FOOT DR TOLLISON & WOMACK: Brand: MEDLINE INDUSTRIES, INC.